# Patient Record
Sex: MALE | Race: BLACK OR AFRICAN AMERICAN | Employment: FULL TIME | URBAN - METROPOLITAN AREA
[De-identification: names, ages, dates, MRNs, and addresses within clinical notes are randomized per-mention and may not be internally consistent; named-entity substitution may affect disease eponyms.]

---

## 2017-12-04 ENCOUNTER — APPOINTMENT (OUTPATIENT)
Dept: PHYSICAL THERAPY | Age: 47
End: 2017-12-04

## 2019-05-29 ENCOUNTER — OFFICE VISIT (OUTPATIENT)
Dept: ORTHOPEDIC SURGERY | Age: 49
End: 2019-05-29

## 2019-05-29 VITALS
DIASTOLIC BLOOD PRESSURE: 76 MMHG | SYSTOLIC BLOOD PRESSURE: 127 MMHG | HEART RATE: 84 BPM | OXYGEN SATURATION: 99 % | RESPIRATION RATE: 16 BRPM

## 2019-05-29 DIAGNOSIS — M17.12 PRIMARY OSTEOARTHRITIS OF LEFT KNEE: Primary | ICD-10-CM

## 2019-05-29 DIAGNOSIS — M25.562 ACUTE PAIN OF LEFT KNEE: ICD-10-CM

## 2019-05-29 RX ORDER — TRIAMCINOLONE ACETONIDE 40 MG/ML
40 INJECTION, SUSPENSION INTRA-ARTICULAR; INTRAMUSCULAR ONCE
Qty: 1 ML | Refills: 0
Start: 2019-05-29 | End: 2019-05-29

## 2019-05-29 RX ORDER — MELOXICAM 15 MG/1
15 TABLET ORAL
Qty: 30 TAB | Refills: 1 | Status: CANCELLED | OUTPATIENT
Start: 2019-05-29

## 2019-05-29 NOTE — PROGRESS NOTES
Fatoumata Scott  1970   Chief Complaint   Patient presents with    Knee Pain     left knee pain        HISTORY OF PRESENT ILLNESS  Jacob Madera is a 47 y.o. male who presents today for evaluation of left knee pain. The patient was referred by Yoni Coker. He rates his pain 3/10 today. Pain has been present for a couple months. He states the pain is worst in the winter time. Associated symptom includes swelling. He does report trying to loss weight recently. Patient describes the pain as aching that is Intermittent in nature. Symptoms are worse with walking and standing , Activity and is better with  Rest. Associated symptoms include Swelling. Since problem started, it: has improved. Pain does not wake patient up at night. Has taken no meds for the problem. Has tried following treatments: Injections:NO; Brace:NO; Therapy:NO; Cane/Crutch:NO       Allergies not on file     No past medical history on file.    Social History     Socioeconomic History    Marital status: UNKNOWN     Spouse name: Not on file    Number of children: Not on file    Years of education: Not on file    Highest education level: Not on file   Occupational History    Not on file   Social Needs    Financial resource strain: Not on file    Food insecurity:     Worry: Not on file     Inability: Not on file    Transportation needs:     Medical: Not on file     Non-medical: Not on file   Tobacco Use    Smoking status: Not on file   Substance and Sexual Activity    Alcohol use: Not on file    Drug use: Not on file    Sexual activity: Not on file   Lifestyle    Physical activity:     Days per week: Not on file     Minutes per session: Not on file    Stress: Not on file   Relationships    Social connections:     Talks on phone: Not on file     Gets together: Not on file     Attends Taoist service: Not on file     Active member of club or organization: Not on file     Attends meetings of clubs or organizations: Not on file     Relationship status: Not on file    Intimate partner violence:     Fear of current or ex partner: Not on file     Emotionally abused: Not on file     Physically abused: Not on file     Forced sexual activity: Not on file   Other Topics Concern    Not on file   Social History Narrative    Not on file      No past surgical history on file. No family history on file. Current Outpatient Medications   Medication Sig    triamcinolone acetonide (KENALOG) 40 mg/mL injection 1 mL by IntraMUSCular route once for 1 dose. No current facility-administered medications for this visit. REVIEW OF SYSTEM   Patient denies: Weight loss, Fever/Chills, HA, Visual changes, Fatigue, Chest pain, SOB, Abdominal pain, N/V/D/C, Blood in stool or urine, Edema. Pertinent positive as above in HPI. All others were negative    PHYSICAL EXAM:   Visit Vitals  /76 (BP 1 Location: Right arm, BP Patient Position: Sitting)   Pulse 84   Resp 16   SpO2 99%     The patient is a well-developed, well-nourished male   in no acute distress. The patient is alert and oriented times three. The patient is alert and oriented times three. Mood and affect are normal.  LYMPHATIC: lymph nodes are not enlarged and are within normal limits  SKIN: normal in color and non tender to palpation. There are no bruises or abrasions noted. NEUROLOGICAL: Motor sensory exam is within normal limits. Reflexes are equal bilaterally.  There is normal sensation to pinprick and light touch  MUSCULOSKELETAL:  Examination Left knee   Skin Intact   Range of motion 0-130   Effusion +   Medial joint line tenderness +   Lateral joint line tenderness -   Tenderness Pes Bursa -   Tenderness insertion MCL -   Tenderness insertion LCL -   Vernas -   Patella crepitus +   Patella grind -   Lachman -   Pivot shift -   Anterior drawer -   Posterior drawer -   Varus stress -   Valgus stress -   Neurovascular Intact   Calf Swelling and Tenderness to Palpation - Josue's Test -   Hamstring Cord Tightness -      PROCEDURE: Left Knee Injection with Ultrasound Guidance  Indication:Left Knee pain/swelling    After sterile prep, 4 cc of Xylocaine and 1 cc of Kenalog were injected into the left knee. Ultrasound images captured using [employer] Ultrasound machine and scanned into patient's chart. [facility]  OFFICE PROCEDURE PROGRESS NOTE        Chart reviewed for the following:  Camilo Prado M.D, have reviewed the History, Physical and updated the Allergic reactions for Haskel Brunner performed immediately prior to start of procedure:  Camilo Prado M.D, have performed the following reviews on [address] prior to the start of the procedure:            * Patient was identified by name and date of birth   * Agreement on procedure being performed was verified  * Risks and Benefits explained to the patient  * Procedure site verified and marked as necessary  * Patient was positioned for comfort  * Consent was signed and verified     Time: 10:57 AM     Date of procedure: 5/29/2019    Procedure performed by:  Lucy Rodriguez M.D    Provider assisted by: (see medication administration)    How tolerated by patient: tolerated the procedure well with no complications    Comments: none    IMAGING: XR of left knee dated 5/29/19 was reviewed and read: slight decrease in the medial joint line with mild degenerative changes in the patellofemoral joint. IMPRESSION:      ICD-10-CM ICD-9-CM    1. Primary osteoarthritis of left knee M17.12 715.16 TRIAMCINOLONE ACETONIDE INJ      triamcinolone acetonide (KENALOG) 40 mg/mL injection      US GUIDE INJ/ASP/ARTHRO LG JNT/BURSA   2. Acute pain of left knee M25.562 719.46 AMB POC XRAY, KNEE; 1/2 VIEWS        PLAN:  1. The patient presents today with left knee pain due to osteoarthritis and I would like to try an injection today.   Risk factors include: n/a  2. No ultrasound exam indicated today  3. Yes cortisone injection indicated today L KNEE US  4. No Physical/Occupational Therapy indicated today  5. No diagnostic test indicated today:   6. No durable medical equipment indicated today  7. No referral indicated today   8. No medications indicated today:   9. No Narcotic indicated today     RTC 3 weeks if pain continues     Office note will be sent to referring provider. Scribed by Asia Banuelos ([address] [place] [address] [address]) as dictated by Anna Kelsey MD    I, Dr. Anna Kelsey, confirm that all documentation is accurate.     Anna Kelsey M.D.   [facility]

## 2019-07-02 ENCOUNTER — OFFICE VISIT (OUTPATIENT)
Dept: ORTHOPEDIC SURGERY | Age: 49
End: 2019-07-02

## 2019-07-02 VITALS
BODY MASS INDEX: 43.1 KG/M2 | DIASTOLIC BLOOD PRESSURE: 86 MMHG | TEMPERATURE: 98.2 F | HEART RATE: 87 BPM | HEIGHT: 69 IN | WEIGHT: 291 LBS | OXYGEN SATURATION: 97 % | RESPIRATION RATE: 16 BRPM | SYSTOLIC BLOOD PRESSURE: 133 MMHG

## 2019-07-02 DIAGNOSIS — S83.242A ACUTE MEDIAL MENISCUS TEAR OF LEFT KNEE, INITIAL ENCOUNTER: Primary | ICD-10-CM

## 2019-07-02 DIAGNOSIS — M17.12 PRIMARY OSTEOARTHRITIS OF LEFT KNEE: ICD-10-CM

## 2019-07-02 PROBLEM — E66.01 OBESITY, MORBID (HCC): Status: ACTIVE | Noted: 2019-07-02

## 2019-07-02 RX ORDER — GLIPIZIDE 10 MG/1
10 TABLET ORAL 2 TIMES DAILY
COMMUNITY

## 2019-07-02 RX ORDER — TRAMADOL HYDROCHLORIDE 50 MG/1
50 TABLET ORAL
COMMUNITY

## 2019-07-02 NOTE — PATIENT INSTRUCTIONS
Meniscus Tear: Care Instructions Your Care Instructions The meniscus is rubbery tissue in the knee that acts as a shock absorber between the upper and lower leg bones. The meniscus also keeps your knee stable by spreading weight across it. Each knee has two menisci (plural of meniscus). You can tear a meniscus if you plant your foot and twist, or pivot. The meniscus also can wear down as you age, and it can tear from squatting or kneeling. Small tears may heal on their own with rest and some physical therapy. But a more serious tear may need surgery to repair it or to remove part of the meniscus. Your doctor may want you to see a doctor who specializes in bones and sports injuries. Follow-up care is a key part of your treatment and safety. Be sure to make and go to all appointments, and call your doctor if you are having problems. It's also a good idea to know your test results and keep a list of the medicines you take. How can you care for yourself at home? · Rest your knee when possible. · Do not squat or kneel. · Take pain medicines exactly as directed. ? If the doctor gave you a prescription medicine for pain, take it as prescribed. ? If you are not taking a prescription pain medicine, ask your doctor if you can take an over-the-counter medicine. · Put ice or a cold pack on your knee for 10 to 20 minutes at a time. Try to do this every 1 to 2 hours for the next 3 days (when you are awake) or until the swelling goes down. Put a thin cloth between the ice and your skin. · Prop up the sore leg on a pillow when you ice your knee or any time you sit or lie down during the next 3 days. Try to keep your leg above the level of your heart. This will help reduce swelling. · Follow your doctor's directions for using crutches or a knee brace, if suggested. · Follow your doctor's directions for exercises to keep your knee mobile and your leg muscles strong.  Here are a few exercises you can try if your doctor says it is okay. ? Quad sets: Lie down on the floor or the bed with your injured leg straight. Fully extend your legthere should be no or little bend in your knee. Tighten the thigh (quadriceps) of your injured leg for 6 seconds. Do not lift your heel up. Relax your quadriceps for 10 seconds. Repeat this exercise 8 to 12 times several times during the day. ? Straight-leg raises: Lie down on the floor or the bed with your injured leg flat and your uninjured leg bent so that the bottom of your foot is on the floor or bed. Tighten the quadriceps of your injured leg. Keeping your knee as straight as possible, lift your injured leg off the bed until it is about 18 inches above the bed or floor. Lower your leg back down and relax for 5 seconds. Do 3 sets of 20 repetitions, or if you tire quickly, 3 sets of 8 to 12 repetitions. ? Heel raises: Stand with your feet a few inches apart. Rest your hands lightly on a counter or chair in front of you. Slowly raise your heels off the floor while keeping your knees straight. Hold for 3 seconds, then slowly lower your heels to the floor. Do 3 sets of 8 to 12 repetitions. ? Heel slides: Lie down on the floor or the bed with your leg flat. Slowly begin to slide your heel toward your rear end (buttocks), keeping your heel on the floor. Your knee will begin to bend. Slide your heel and bend your knee until it becomes a little sore and you can feel a small amount of pressure inside your knee. Hold this position for 10 seconds. Slide your heel back down until your leg is straight on the floor. Relax for 10 seconds. Repeat this exercise 20 times. When should you call for help? Watch closely for changes in your health, and be sure to contact your doctor if: 
  · You have increasing knee pain or swelling or both.  
  · Your knee is so sore or stiff that you cannot walk on it.  
  · You do not get better as expected. Where can you learn more? Go to http://wilmar-izzy.info/. Enter R346 in the search box to learn more about \"Meniscus Tear: Care Instructions. \" Current as of: September 20, 2018 Content Version: 11.9 © 6770-5763 [employer]. Care instructions adapted under license by [employer] (which disclaims liability or warranty for this information). If you have questions about a medical condition or this instruction, always ask your healthcare professional. [place] [address] any warranty or liability for your use of this information.

## 2019-07-02 NOTE — PROGRESS NOTES
Carlyle Gilberto Scott  1970   Chief Complaint   Patient presents with    Knee Pain     left knee pain, f/u appt. HISTORY OF PRESENT ILLNESS  Renetta Coles is a 47 y.o. male who presents today for reevaluation of left knee pain. Patient rates pain as 0/10 today. Pain has been present for about a year. He states the pain is worst in the winter time. Associated symptom includes swelling. He does report trying to loss weight recently. At last OV, patient had a cortisone injection which provided good relief. Pt still has swelling in knee. Patient denies any fever, chills, chest pain, shortness of breath or calf pain. The remainder of the review of systems is negative. There are no new illness or injuries to report since last seen in the office. There are no changes to medications, allergies, family or social history. PHYSICAL EXAM:   Visit Vitals  /86 (BP 1 Location: Left arm, BP Patient Position: Sitting)   Pulse 87   Temp 98.2 °F (36.8 °C) (Oral)   Resp 16   Ht 5' 9\" (1.753 m)   Wt 291 lb (132 kg)   SpO2 97%   BMI 42.97 kg/m²     The patient is a well-developed, well-nourished male   in no acute distress. The patient is alert and oriented times three. The patient is alert and oriented times three. Mood and affect are normal.  LYMPHATIC: lymph nodes are not enlarged and are within normal limits  SKIN: normal in color and non tender to palpation. There are no bruises or abrasions noted. NEUROLOGICAL: Motor sensory exam is within normal limits. Reflexes are equal bilaterally.  There is normal sensation to pinprick and light touch  MUSCULOSKELETAL:  Examination Left knee   Skin Intact   Range of motion 0-130   Effusion +   Medial joint line tenderness +   Lateral joint line tenderness -   Tenderness Pes Bursa -   Tenderness insertion MCL -   Tenderness insertion LCL -   Vernas -   Patella crepitus +   Patella grind -   Lachman -   Pivot shift -   Anterior drawer -   Posterior drawer -   Varus stress -   Valgus stress -   Neurovascular Intact   Calf Swelling and Tenderness to Palpation -   Josue's Test -   Hamstring Cord Tightness      IMAGING: XR of left knee dated 5/29/19 was reviewed and read: slight decrease in the medial joint line with mild degenerative changes in the patellofemoral joint. IMPRESSION:      ICD-10-CM ICD-9-CM    1. Acute medial meniscus tear of left knee, initial encounter S83.242A 836.0 MRI KNEE LT WO CONT   2. Primary osteoarthritis of left knee M17.12 715.16         PLAN:   1. Pt presents today with left knee pain and I would like to get an MRI to r/o meniscal tear. Risk factors include: n/a  2. No ultrasound exam indicated today  3. No cortisone injection indicated today   4. No Physical/Occupational Therapy indicated today  5. Yes diagnostic test indicated today: MRI L KNEE  6. No durable medical equipment indicated today  7. No referral indicated today   8. No medications indicated today:   9. No Narcotic indicated today       RTC following MRI      Scribed by Festus Ganser [address]) as dictated by Hema Melendez MD    I, Dr. Hema Melendez, confirm that all documentation is accurate.     Hema Melendez M.D.   [facility]

## 2019-07-02 NOTE — PROGRESS NOTES
1. Have you been to the ER, urgent care clinic since your last visit? Hospitalized since your last visit? NO    2. Have you seen or consulted any other health care providers outside of the [address] [place] [address] since your last visit? Include any pap smears or colon screening.  NO

## 2019-07-30 ENCOUNTER — DOCUMENTATION ONLY (OUTPATIENT)
Dept: ORTHOPEDIC SURGERY | Age: 49
End: 2019-07-30

## 2019-07-30 NOTE — PROGRESS NOTES
Order and office notes have been faxed to [address] [place] [address] to have them call patient to set up MRI Knee. They will authorize with the insurance if needed. Patient aware.

## 2019-09-03 DIAGNOSIS — S83.242A ACUTE MEDIAL MENISCUS TEAR OF LEFT KNEE, INITIAL ENCOUNTER: ICD-10-CM

## 2020-06-11 ENCOUNTER — OFFICE VISIT (OUTPATIENT)
Dept: ORTHOPEDIC SURGERY | Age: 50
End: 2020-06-11

## 2020-06-11 VITALS — HEIGHT: 69 IN | BODY MASS INDEX: 45.2 KG/M2 | WEIGHT: 305.2 LBS | TEMPERATURE: 97.9 F

## 2020-06-11 DIAGNOSIS — S83.242D TEAR OF MEDIAL MENISCUS OF LEFT KNEE, CURRENT, UNSPECIFIED TEAR TYPE, SUBSEQUENT ENCOUNTER: Primary | ICD-10-CM

## 2020-06-11 DIAGNOSIS — M17.12 PRIMARY OSTEOARTHRITIS OF LEFT KNEE: ICD-10-CM

## 2020-06-11 RX ORDER — DICLOFENAC SODIUM 10 MG/G
GEL TOPICAL 4 TIMES DAILY
COMMUNITY

## 2020-06-11 RX ORDER — TRIAMCINOLONE ACETONIDE 40 MG/ML
40 INJECTION, SUSPENSION INTRA-ARTICULAR; INTRAMUSCULAR ONCE
Qty: 1 ML | Refills: 0
Start: 2020-06-11 | End: 2020-06-11

## 2020-06-11 RX ORDER — ALOGLIPTIN 25 MG/1
25 TABLET, FILM COATED ORAL DAILY
COMMUNITY

## 2020-06-11 RX ORDER — ASPIRIN 81 MG/1
TABLET ORAL DAILY
COMMUNITY

## 2020-06-11 RX ORDER — NAPROXEN SODIUM 220 MG
220 TABLET ORAL 2 TIMES DAILY WITH MEALS
COMMUNITY

## 2020-06-11 RX ORDER — LISINOPRIL 10 MG/1
TABLET ORAL DAILY
COMMUNITY

## 2020-06-11 NOTE — PROGRESS NOTES
1. Have you been to the ER, urgent care clinic since your last visit? Hospitalized since your last visit? No    2. Have you seen or consulted any other health care providers outside of the [address] [place] [address] since your last visit? Include any pap smears or colon screening.  No

## 2020-06-11 NOTE — PROGRESS NOTES
Osiris Scott  1970   Chief Complaint   Patient presents with    Knee Pain     Left knee pain         HISTORY OF PRESENT ILLNESS  Muriel Grande is a 45 y.o. male who presents today for reevaluation of left knee pain and MRI review. Patient rates pain as 5/10 today. Pain has been present for awhile. Pt was last seen here in July 2019. [facility] The patient had a cortisone injection on 5/29/19 which provided good relief for around 3 months. Pt reports limping. Has been using a knee brace and has tried taking Aleve. Surgery was discussed with the patient today but they are not ready for surgery at this point. Patient denies any fever, chills, chest pain, shortness of breath or calf pain. The remainder of the review of systems is negative. There are no new illness or injuries to report since last seen in the office. There are no changes to medications, allergies, family or social history. Pain Assessment  6/11/2020   Location of Pain Knee   Location Modifiers Left   Severity of Pain 5   Quality of Pain Throbbing   Quality of Pain Comment -   Duration of Pain A few hours   Duration of Pain Comment -   Frequency of Pain Intermittent   Aggravating Factors -   Aggravating Factors Comment -   Limiting Behavior -   Relieving Factors Rest;Elevation; Heat   Result of Injury No     PHYSICAL EXAM:   Visit Vitals  Temp 97.9 °F (36.6 °C) (Oral)   Ht 5' 9\" (1.753 m)   Wt 305 lb 3.2 oz (138.4 kg)   BMI 45.07 kg/m²     The patient is a well-developed, well-nourished male   in no acute distress. The patient is alert and oriented times three. The patient is alert and oriented times three. Mood and affect are normal.  LYMPHATIC: lymph nodes are not enlarged and are within normal limits  SKIN: normal in color and non tender to palpation. There are no bruises or abrasions noted. NEUROLOGICAL: Motor sensory exam is within normal limits. Reflexes are equal bilaterally.  There is normal sensation to pinprick and light touch  MUSCULOSKELETAL:  Examination Left knee   Skin Intact   Range of motion 0-130   Effusion +   Medial joint line tenderness +   Lateral joint line tenderness -   Tenderness Pes Bursa -   Tenderness insertion MCL -   Tenderness insertion LCL -   Vernas -   Patella crepitus +   Patella grind -   Lachman -   Pivot shift -   Anterior drawer -   Posterior drawer -   Varus stress -   Valgus stress -   Neurovascular Intact   Calf Swelling and Tenderness to Palpation -   Josue's Test -   Hamstring Cord Tightness      PROCEDURE: After sterile prep, 4 cc of Xylocaine and 1 cc of Kenalog were injected into the left knee. [facility]  OFFICE PROCEDURE PROGRESS NOTE        Chart reviewed for the following:  Osiris Lo MD, have reviewed the History, Physical and updated the Allergic reactions for [address] performed immediately prior to start of procedure:  Osiris Lo MD, have performed the following reviews on [address] prior to the start of the procedure:            * Patient was identified by name and date of birth   * Agreement on procedure being performed was verified  * Risks and Benefits explained to the patient  * Procedure site verified and marked as necessary  * Patient was positioned for comfort  * Consent was signed and verified     Time: 10:07 AM    Date of procedure: 6/11/2020    Procedure performed by:  Gwen Norris MD    Provider assisted by: (see medication administration)    How tolerated by patient: tolerated the procedure well with no complications    Comments: none    IMAGING: MRI of left knee [facility] dated 8/21/19 reviewed and read by Dr. Margoth Stewart:  IMPRESSION:  1. 1-2 cm horizontal oblique tear posterior horn medial meniscus  2. Anterior cruciate ligament intact.   3. Bicompartmental chondromalacia,. Grade 3-4 patellofemoral compartment and medial compartment. 4. Mild diffuse patellar tendinopathy. XR of left knee dated 5/29/19 was reviewed and read: Decreased joint space on the medial side. IMPRESSION:      ICD-10-CM ICD-9-CM    1. Tear of medial meniscus of left knee, current, unspecified tear type, subsequent encounter S83.242D V58.89 TRIAMCINOLONE ACETONIDE INJ     836.0 triamcinolone acetonide (Kenalog) 40 mg/mL injection      DRAIN/INJECT LARGE JOINT/BURSA   2. Primary osteoarthritis of left knee M17.12 715.16 TRIAMCINOLONE ACETONIDE INJ      triamcinolone acetonide (Kenalog) 40 mg/mL injection      DRAIN/INJECT LARGE JOINT/BURSA        PLAN:   1. Pt presents today with left knee pain due to an MRI-documented medial meniscus tear and primary OA and I would like to try an injection today. Surgery was discussed with the patient today but they are not ready for surgery at this point, would like to try injection first.   Risk factors include: BMI>40  2. No ultrasound exam indicated today  3. Yes cortisone injection indicated today L KNEE  4. No Physical/Occupational Therapy indicated today  5. No diagnostic test indicated today  6. No durable medical equipment indicated today  7. No referral indicated today   8. No medications indicated today:   9. No Narcotic indicated today       RTC prn      Scribed by Esperanza Oseguera) as dictated by Montez Sal MD    I, Dr. Montez Sal, confirm that all documentation is accurate.     Montez Sal M.D.   [facility]

## 2020-10-12 ENCOUNTER — OFFICE VISIT (OUTPATIENT)
Dept: ORTHOPEDIC SURGERY | Age: 50
End: 2020-10-12
Payer: COMMERCIAL

## 2020-10-12 VITALS
HEART RATE: 82 BPM | SYSTOLIC BLOOD PRESSURE: 126 MMHG | HEIGHT: 69 IN | TEMPERATURE: 97.8 F | DIASTOLIC BLOOD PRESSURE: 87 MMHG | BODY MASS INDEX: 41.47 KG/M2 | OXYGEN SATURATION: 99 % | WEIGHT: 280 LBS

## 2020-10-12 DIAGNOSIS — S83.242D TEAR OF MEDIAL MENISCUS OF LEFT KNEE, CURRENT, UNSPECIFIED TEAR TYPE, SUBSEQUENT ENCOUNTER: Primary | ICD-10-CM

## 2020-10-12 DIAGNOSIS — M22.2X1 PATELLOFEMORAL PAIN SYNDROME OF RIGHT KNEE: ICD-10-CM

## 2020-10-12 DIAGNOSIS — M17.12 PRIMARY OSTEOARTHRITIS OF LEFT KNEE: ICD-10-CM

## 2020-10-12 DIAGNOSIS — M25.561 RIGHT KNEE PAIN, UNSPECIFIED CHRONICITY: ICD-10-CM

## 2020-10-12 PROCEDURE — 73560 X-RAY EXAM OF KNEE 1 OR 2: CPT | Performed by: ORTHOPAEDIC SURGERY

## 2020-10-12 PROCEDURE — 99213 OFFICE O/P EST LOW 20 MIN: CPT | Performed by: ORTHOPAEDIC SURGERY

## 2020-10-12 RX ORDER — MELOXICAM 15 MG/1
15 TABLET ORAL
Qty: 30 TAB | Refills: 1 | Status: SHIPPED | OUTPATIENT
Start: 2020-10-12

## 2020-10-12 NOTE — PROGRESS NOTES
Analy Scott  1970   Chief Complaint   Patient presents with    Knee Pain     BILAT        HISTORY OF PRESENT ILLNESS  Carolin Dill is a 45 y.o. male who presents today for reevaluation of b/l knee pain. L>R. Patient rates pain as 5/10 today. Pain has been present for awhile. At last OV on 6/11/2020, patient had a left knee cortisone injection. Left knee pain has worsened since last OV. He wants to set up surgery for the left knee. He has an MRI-documented left medial meniscus tear. Pt reports right knee pain X 2 months. Right knee pain is not as bad as left knee. [facility]. The patient also had a cortisone injection on 5/29/19 which provided good relief for around 3 months. Pt reports limping. Has been using a knee brace and has tried taking Aleve. Patient denies any fever, chills, chest pain, shortness of breath or calf pain. The remainder of the review of systems is negative. There are no new illness or injuries to report since last seen in the office. There are no changes to medications, allergies, family or social history. Pain Assessment  10/12/2020   Location of Pain Knee   Location Modifiers Left;Right   Severity of Pain 5   Quality of Pain Sharp   Quality of Pain Comment -   Duration of Pain -   Duration of Pain Comment -   Frequency of Pain Constant   Aggravating Factors Walking;Standing   Aggravating Factors Comment -   Limiting Behavior Yes   Relieving Factors Nothing   Result of Injury No     PHYSICAL EXAM:   Visit Vitals  /87   Pulse 82   Temp 97.8 °F (36.6 °C) (Oral)   Ht 5' 9\" (1.753 m)   Wt 280 lb (127 kg)   SpO2 99%   BMI 41.35 kg/m²     The patient is a well-developed, well-nourished male   in no acute distress. The patient is alert and oriented times three. The patient is alert and oriented times three.  Mood and affect are normal.  LYMPHATIC: lymph nodes are not enlarged and are within normal limits  SKIN: normal in color and non tender to palpation. There are no bruises or abrasions noted. NEUROLOGICAL: Motor sensory exam is within normal limits. Reflexes are equal bilaterally. There is normal sensation to pinprick and light touch  MUSCULOSKELETAL:  Examination Left knee   Skin Intact   Range of motion 0-130   Effusion +   Medial joint line tenderness +   Lateral joint line tenderness -   Tenderness Pes Bursa -   Tenderness insertion MCL -   Tenderness insertion LCL -   Vernas -   Patella crepitus +   Patella grind -   Lachman -   Pivot shift -   Anterior drawer -   Posterior drawer -   Varus stress -   Valgus stress -   Neurovascular Intact   Calf Swelling and Tenderness to Palpation -   Josue's Test -   Hamstring Cord Tightness      Examination Right knee   Skin Intact   Range of motion 0-130   Effusion -   Medial joint line tenderness -   Lateral joint line tenderness -   Tenderness Pes Bursa -   Tenderness insertion MCL -   Tenderness insertion LCL -   Vernas -   Patella crepitus -   Patella grind -   Lachman -   Pivot shift -   Anterior drawer -   Posterior drawer -   Varus stress -   Valgus stress -   Neurovascular Intact   Calf Swelling and Tenderness to Palpation -   Josue's Test -   Hamstring Cord Tightness -         IMAGING: XR of right knee obtained in the office dated 10/12/2020 was reviewed and read by Dr. Jared Herrera: mild degebnerative changes in the patello femoral joint        MRI of left knee [facility] dated 8/21/19 reviewed and read by Dr. Jared Herrera:  IMPRESSION:  1. 1-2 cm horizontal oblique tear posterior horn medial meniscus  2. Anterior cruciate ligament intact. 3. Bicompartmental chondromalacia,. Grade 3-4 patellofemoral compartment and medial compartment. 4. Mild diffuse patellar tendinopathy. XR of left knee dated 5/29/19 was reviewed and read: Decreased joint space on the medial side. IMPRESSION:      ICD-10-CM ICD-9-CM    1.  Tear of medial meniscus of left knee, current, unspecified tear type, subsequent encounter  S83.242D V58.89 meloxicam (Mobic) 15 mg tablet     836.0    2. Right knee pain, unspecified chronicity  M25.561 719.46 AMB POC XRAY, KNEE; 1/2 VIEWS   3. Primary osteoarthritis of left knee  M17.12 715.16 meloxicam (Mobic) 15 mg tablet   4. Patellofemoral pain syndrome of right knee  M22.2X1 719.46 meloxicam (Mobic) 15 mg tablet        PLAN:   1. Pt presents today with left knee pain due to an MRI-documented medial meniscus tear and he would like to proceed with scheduling surgery for the left knee. Cortisone injections for the right knee were discussed with the patient today but they feel the pain is not enough to move forward with a cortisone injection at this time. Was given a prescription for Mobic today. I discussed the risks and benefits and potential adverse outcomes of both operative vs non operative treatment of left knee medial meniscus tear with the patient and patient wishes to proceed with arthroscopic left partial medial meniscectomy. Risks of operative intervention include but not limited to bleeding, infection, deep vein thrombosis, pulmonary embolism, death, limb length discrepancy, reflexive sympathetic dystrophy, fat embolism syndrome,damage to blood vessels and nerves, malunion, non-union, delayed union, failure of hardware, post traumatic arthritis, stroke, heart attack, and death. Patient understands that infection may arise and may require numerous surgeries. The patient was counseled at length about the risks of sandro Covid-19 during their perioperative period and any recovery window from their procedure. The patient was made aware that sandro Covid-19  may worsen their prognosis for recovering from their procedure and lend to a higher morbidity and/or mortality risk. All material risks, benefits, and reasonable alternatives including postponing the procedure were discussed.  The patient does  wish to proceed with the procedure at this time.       History and physical exam to be preformed at a later date. Risk factors include: BMI>40  2. No ultrasound exam indicated today  3. No cortisone injection indicated today  4. No Physical/Occupational Therapy indicated today  5. No diagnostic test indicated today  6. No durable medical equipment indicated today  7. No referral indicated today   8. Yes medications indicated today: MOBIC  9. No Narcotic indicated today       RTC H&P      Scribed by [employer] [address] [place] [address] [address]) as dictated by Hesham Kang MD    I, Dr. Hesham Kang, confirm that all documentation is accurate.     Hesham Kang M.D.   [facility]

## 2020-12-03 DIAGNOSIS — Z01.818 PREOP EXAMINATION: ICD-10-CM

## 2020-12-03 DIAGNOSIS — Z01.818 PREOP EXAMINATION: Primary | ICD-10-CM

## 2020-12-30 ENCOUNTER — HOSPITAL ENCOUNTER (OUTPATIENT)
Dept: LAB | Age: 50
Discharge: HOME | End: 2020-12-30
Payer: OTHER GOVERNMENT

## 2020-12-30 LAB
ANION GAP SERPL CALC-SCNC: 7 MMOL/L (ref 3–18)
BASOPHILS # BLD: 0 K/UL (ref 0–0.1)
BASOPHILS NFR BLD: 0 % (ref 0–2)
BUN SERPL-MCNC: 10 MG/DL (ref 7–18)
BUN/CREAT SERPL: 10 (ref 12–20)
CALCIUM SERPL-MCNC: 9.3 MG/DL (ref 8.5–10.1)
CHLORIDE SERPL-SCNC: 104 MMOL/L (ref 100–111)
CO2 SERPL-SCNC: 26 MMOL/L (ref 21–32)
CREAT SERPL-MCNC: 0.98 MG/DL (ref 0.6–1.3)
DIFFERENTIAL METHOD BLD: ABNORMAL
EOSINOPHIL # BLD: 0.2 K/UL (ref 0–0.4)
EOSINOPHIL NFR BLD: 1 % (ref 0–5)
ERYTHROCYTE [DISTWIDTH] IN BLOOD BY AUTOMATED COUNT: 14.4 % (ref 11.6–14.5)
GLUCOSE SERPL-MCNC: 113 MG/DL (ref 74–99)
HCT VFR BLD AUTO: 43.2 % (ref 36–48)
HGB BLD-MCNC: 13.9 G/DL (ref 13–16)
LYMPHOCYTES # BLD: 4.1 K/UL (ref 0.9–3.6)
LYMPHOCYTES NFR BLD: 32 % (ref 21–52)
MCH RBC QN AUTO: 26.7 PG (ref 24–34)
MCHC RBC AUTO-ENTMCNC: 32.2 G/DL (ref 31–37)
MCV RBC AUTO: 82.9 FL (ref 74–97)
MONOCYTES # BLD: 0.6 K/UL (ref 0.05–1.2)
MONOCYTES NFR BLD: 5 % (ref 3–10)
NEUTS SEG # BLD: 7.9 K/UL (ref 1.8–8)
NEUTS SEG NFR BLD: 62 % (ref 40–73)
PLATELET # BLD AUTO: 365 K/UL (ref 135–420)
PMV BLD AUTO: 10.6 FL (ref 9.2–11.8)
POTASSIUM SERPL-SCNC: 4.3 MMOL/L (ref 3.5–5.5)
RBC # BLD AUTO: 5.21 M/UL (ref 4.7–5.5)
SODIUM SERPL-SCNC: 137 MMOL/L (ref 136–145)
WBC # BLD AUTO: 12.8 K/UL (ref 4.6–13.2)

## 2020-12-30 PROCEDURE — 36415 COLL VENOUS BLD VENIPUNCTURE: CPT

## 2020-12-30 PROCEDURE — 85025 COMPLETE CBC W/AUTO DIFF WBC: CPT

## 2020-12-30 PROCEDURE — 93005 ELECTROCARDIOGRAM TRACING: CPT

## 2020-12-30 PROCEDURE — 80048 BASIC METABOLIC PNL TOTAL CA: CPT

## 2020-12-31 LAB
ATRIAL RATE: 79 BPM
CALCULATED P AXIS, ECG09: 47 DEGREES
CALCULATED R AXIS, ECG10: 13 DEGREES
CALCULATED T AXIS, ECG11: 17 DEGREES
DIAGNOSIS, 93000: NORMAL
P-R INTERVAL, ECG05: 172 MS
Q-T INTERVAL, ECG07: 366 MS
QRS DURATION, ECG06: 96 MS
QTC CALCULATION (BEZET), ECG08: 419 MS
VENTRICULAR RATE, ECG03: 79 BPM

## 2021-01-04 ENCOUNTER — OFFICE VISIT (OUTPATIENT)
Dept: ORTHOPEDIC SURGERY | Age: 51
End: 2021-01-04

## 2021-01-04 VITALS
HEIGHT: 69 IN | OXYGEN SATURATION: 99 % | DIASTOLIC BLOOD PRESSURE: 72 MMHG | RESPIRATION RATE: 16 BRPM | HEART RATE: 87 BPM | WEIGHT: 308.4 LBS | SYSTOLIC BLOOD PRESSURE: 129 MMHG | TEMPERATURE: 96.4 F | BODY MASS INDEX: 45.68 KG/M2

## 2021-01-04 DIAGNOSIS — S83.242D TEAR OF MEDIAL MENISCUS OF LEFT KNEE, CURRENT, UNSPECIFIED TEAR TYPE, SUBSEQUENT ENCOUNTER: Primary | ICD-10-CM

## 2021-01-04 DIAGNOSIS — M17.12 PRIMARY OSTEOARTHRITIS OF LEFT KNEE: ICD-10-CM

## 2021-01-04 RX ORDER — HYDROCODONE BITARTRATE AND ACETAMINOPHEN 7.5; 325 MG/1; MG/1
1 TABLET ORAL
Qty: 40 TAB | Refills: 0 | Status: SHIPPED | OUTPATIENT
Start: 2021-01-04 | End: 2021-01-11

## 2021-01-04 NOTE — PATIENT INSTRUCTIONS
Dr. Clarke Knee Arthroscopy Surgery    What is the surgery?  - This is an outpatient procedure at either [facility] or [facility]  - You will be completely asleep for procedure. Dr. Clarke will make 2 small incisions in your knee. He will take a tour of your knee with the camera and then address the meniscal tear(s). We will be able to evaluate if any arthritis in your knee but this surgery is not to treat your arthritis.  - Total surgery takes about 25-30 mins     What can you expect after surgery?  - You will have a bulky dressing on your knee that you can remove 2 days after surgery. You will be able to shower 2 days after surgery but no soaking in a bath, hot tub, ocean or pool x 2 weeks to allow for full wound healing. No special brace is needed.  - You will be on crutches or a walker when you leave the hospital. You can place weight on your leg as tolerated starting immediately. You are usually on crutches or your walker for about 4-5 days.   - Even though you can place weight on your leg, we recommend no walking or standing longer than 10mins for the first week. We will gradually increase your activities after that point.    - Dr. Clarke will start physical therapy for you when you return for your 1 week post op apt  - It will take your 6-8 weeks to fully recover from your surgery.    When can I return to work?  - Most patients return to desk work only after 1-2 weeks. We recommend no prolonged walking or standing, climbing, kneeling, or crawling x 6-8 weeks.     Not all knee arthroscopies are the same. The specifics of your individual case will be discussed at length with you by Dr. Clarke and his Physician Assistant.        Marita Rincon  Surgical Coordinator  [address] [place] [address]  [email or web address]  P: [phone]  F: [phone]

## 2021-01-04 NOTE — PROGRESS NOTES
HISTORY AND PHYSICAL          Patient: Ivon De Oliveira                MRN: [id number]       SSN: [id number]  YOB: 1970          AGE: 45 y.o. SEX: male      Patient scheduled for:  Left knee arthroscopic partial medial menisectomy    Surgeon: Lázaro Cano MD    ANESTHESIA TYPE:  General    HISTORY:     The patient was seen in the office today for a preoperative history and physical for an upcoming above listed surgery. The patient is a pleasant 45 y.o. male who has a history of left knee pain. Patient rates pain as 5/10 today. Pain has been present for awhile. At last OV on 6/11/2020, patient had a left knee cortisone injection. Left knee pain has worsened since last OV. He wants to set up surgery for the left knee. He has an MRI-documented left medial meniscus tear. Pt reports right knee pain X 2 months. Right knee pain is not as bad as left knee. [facility]. The patient also had a cortisone injection on 5/29/19 which provided good relief for around 3 months. Pt reports limping. Has been using a knee brace and has tried taking Aleve. Due to the current findings, affected activity of daily living and continued pain and discomfort, surgical intervention is indicated. The alternatives, risks, and complications, including but not limited to infection, blood loss, need for blood transfusion, neurovascular damage, kaylen-incisional numbness, subcutaneous hematoma, bone fracture, anesthetic complications, DVT, PE, death, RSD, postoperative stiffness and pain, possible surgical scar, delayed healing and nonhealing, reflexive sympathetic dystrophy, damage to blood vessels and nerves, need for more surgery, MI, and stroke,  failure of hardware, gait disturbances,have been discussed. The patient understands and wishes to proceed with surgery. PAST MEDICAL HISTORY:     History reviewed. No pertinent past medical history.     CURRENT MEDICATIONS:     Current Outpatient Medications   Medication Sig Dispense Refill    HYDROcodone-acetaminophen (Norco) 7.5-325 mg per tablet Take 1 Tab by mouth every four (4) hours as needed for Pain for up to 7 days. Max Daily Amount: 6 Tabs. Do not take until after surgery (for acute post operative pain) 40 Tab 0    meloxicam (Mobic) 15 mg tablet Take 1 Tab by mouth daily (with breakfast). 30 Tab 1    naproxen sodium (Aleve) 220 mg tablet Take 220 mg by mouth two (2) times daily (with meals).  diclofenac (VOLTAREN) 1 % gel Apply  to affected area four (4) times daily.  aspirin delayed-release 81 mg tablet Take  by mouth daily.  lisinopriL (PRINIVIL, ZESTRIL) 10 mg tablet Take  by mouth daily.  alogliptin (NESINA) 25 mg tablet Take 25 mg by mouth daily.  traMADol (ULTRAM) 50 mg tablet Take 50 mg by mouth every six (6) hours as needed for Pain.  sAXagliptin (ONGLYZA) 5 mg tab tablet Take  by mouth daily.  glipiZIDE (GLUCOTROL) 10 mg tablet Take 10 mg by mouth two (2) times a day. ALLERGIES:     No Known Allergies      SURGICAL HISTORY:     History reviewed. No pertinent surgical history. SOCIAL HISTORY:     Social History     Socioeconomic History    Marital status:      Spouse name: Not on file    Number of children: Not on file    Years of education: Not on file    Highest education level: Not on file   Tobacco Use    Smoking status: Former Smoker     Types: Cigarettes     Quit date: 1997     Years since quittin.5    Smokeless tobacco: Never Used       FAMILY HISTORY:     History reviewed. No pertinent family history. REVIEW OF SYSTEMS:     Negative for fevers, chills, chest pain, shortness of breath, weight loss, recent illness     General: Negative for fever and chills. No unexpected change in weight. Denies fatigue. No change in appetite. Skin: Negative for rash or itching. HEENT: Negative for congestion, sore throat, neck pain and neck stiffness.  No change in vision or hearing. Hasn't noted any enlarged lymph nodes in the neck. Cardiovascular:  Negative for chest pain and palpitations. Has not noted pedal edema. Respiratory: Negative for cough, colds, sinus, hemoptysis, shortness of breath and wheezing. Gastrointestinal: Negative for nausea and vomiting, rectal bleeding, coffee ground emesis, abdominal pain, diarrhea and constipation. Genitourinary: Negative for dysuria, frequency urgency, or burning on micturition. No flank pain, no foul smelling urine, no difficulty with initiating urination. Hematological: Negative for bleeding or easy bruising. Musculoskeletal: Negative  for arthralgias, back pain or neck pain. Neurological: Negative for dizziness, seizures or syncopal episodes. Denies headaches. Endocrine: Denies excessive thirst.  No heat/cold intolerance. Psychiatric: Negative for depression or insomnia. PHYSICAL EXAMINATION:     VITALS:   Visit Vitals  /72 (BP 1 Location: Right arm, BP Patient Position: Sitting)   Pulse 87   Temp (!) 96.4 °F (35.8 °C) (Temporal)   Resp 16   Ht 5' 9\" (1.753 m)   Wt 308 lb 6.4 oz (139.9 kg)   SpO2 99%   BMI 45.54 kg/m²     GEN:  Well developed, well nourished 45 y.o. male in no acute distress. HEENT: Normocephalic and atraumatic. Eyes: Conjunctivae and EOM are normal.Pupils are equal, round, and reactive to light. External ear normal appearance, external nose normal appearing. Mouth/Throat: Oropharynx is clear and moist, able to handle oral secretions w/out difficulty, airway patent  NECK: Supple. Normal ROM, No lymphadenopathy. Trachea is midline. No bruising, swelling or deformity  RESP: Clear to auscultation bilaterally. No wheezes, rales, rhonchi. Normal effort and breath sounds. No respiratory distress  CARDIO:  Normal rate, regular rhythm and normal heart sounds. No MGR. ABDOMEN: Soft, non-tender, non-distended, normoactive bowel sounds in all four quadrants. There is no tenderness.  There is no rebound and no guarding. BACK: No CVA or spinal tenderness  BREAST:  Deferred  PELVIC:    Deferred   RECTAL:  Deferred   :           Deferred  EXTREMITIES: EXAMINATION OF: left knee  Examination Left knee   Skin Intact   Range of motion 0-130   Effusion +   Medial joint line tenderness +   Lateral joint line tenderness -   Tenderness Pes Bursa -   Tenderness insertion MCL -   Tenderness insertion LCL -   Vernas -   Patella crepitus +   Patella grind -   Lachman -   Pivot shift -   Anterior drawer -   Posterior drawer -   Varus stress -   Valgus stress -   Neurovascular Intact   Calf Swelling and Tenderness to Palpation -   Josue's Test -   Hamstring Cord Tightness         NEUROVASCULAR: Sensation intact to light touch and strength grossly intact and symmetrical. No nystagmus. Positive distal pulses and capillary refill. DVT ASSESSMENT:  There is not  calf tenderness. No evidence of DVT seen on physical exam.  MOTOR: In tact  PSYCH: Alert an oriented to person, place and time. Mood, memory, affect, behavior and judgment normal       RADIOGRAPHS & DIAGNOSTIC STUDIES:     MRI/xray reveals : XR of right knee obtained in the office dated 10/12/2020 was reviewed and read by Dr. Axel Crain: mild degebnerative changes in the patello femoral joint           MRI of left knee from [facility] dated 8/21/19 reviewed and read by Dr. Axel Crain:  IMPRESSION:  1. 1-2 cm horizontal oblique tear posterior horn medial meniscus  2. Anterior cruciate ligament intact. 3. Bicompartmental chondromalacia,. Grade 3-4 patellofemoral compartment and medial compartment.   4. Mild diffuse patellar tendinopathy.         LABS:       @  CBC:   Lab Results   Component Value Date/Time    WBC 12.8 12/30/2020 05:27 PM    RBC 5.21 12/30/2020 05:27 PM    HGB 13.9 12/30/2020 05:27 PM    HCT 43.2 12/30/2020 05:27 PM    PLATELET 295 69/57/1003 05:27 PM    and BMP:   Lab Results   Component Value Date/Time    Glucose 113 (H) 12/30/2020 05:27 PM    Sodium 137 12/30/2020 05:27 PM    Potassium 4.3 12/30/2020 05:27 PM    Chloride 104 12/30/2020 05:27 PM    CO2 26 12/30/2020 05:27 PM    BUN 10 12/30/2020 05:27 PM    Creatinine 0.98 12/30/2020 05:27 PM    Calcium 9.3 12/30/2020 05:27 PM   @    Preoperative labs were reviewed and are substantially within normal limits   EKG: normal EKG, normal sinus rhythm, unchanged from previous tracings. ASSESSMENT:       Encounter Diagnoses   Name Primary?  Tear of medial meniscus of left knee, current, unspecified tear type, subsequent encounter Yes    Primary osteoarthritis of left knee        PLAN:     Again, the alternatives, risks, and complications, as well as expected outcome were discussed. The patient understands and agrees to proceed with left knee arthroscopic partial medial menisectomy. The patient was counseled at length about the risks of sandro Covid-19 during their perioperative period and any recovery window from their procedure. The patient was made aware that sandro Covid-19  may worsen their prognosis for recovering from their procedure and lend to a higher morbidity and/or mortality risk. All material risks, benefits, and reasonable alternatives including postponing the procedure were discussed. The patient does  wish to proceed with the procedure at this time.    Patient given orders listed below:    Orders Placed This Encounter    HYDROcodone-acetaminophen (Norco) 7.5-325 mg per tablet         Haile Diamond PA-C  1/4/2021  2:00 PM

## 2021-01-05 ENCOUNTER — TELEPHONE (OUTPATIENT)
Dept: ORTHOPEDIC SURGERY | Age: 51
End: 2021-01-05

## 2021-01-05 NOTE — TELEPHONE ENCOUNTER
Spoke with Dr. Brittanie Farrell from [facility] and informed her of LOTTIE Mckeon message.

## 2021-01-05 NOTE — TELEPHONE ENCOUNTER
Jalen Higginbotham From the [facility] called stating that the patient brought in a prescription for Norco 7.5-325 but they do not care that dosage in the [address] [facility]. He stated that they do have 5-325 Norco and if the provider is willing to change the prescription to that dosage they will be able to fill it. Genaro did give me the number to be reached at as [phone] ext [phone] and it goes to Dr. Hightower Doing his supervisor who can take the message.

## 2021-01-12 ENCOUNTER — TELEPHONE (OUTPATIENT)
Dept: ORTHOPEDIC SURGERY | Age: 51
End: 2021-01-12

## 2021-01-12 ENCOUNTER — DOCUMENTATION ONLY (OUTPATIENT)
Dept: ORTHOPEDIC SURGERY | Age: 51
End: 2021-01-12

## 2021-01-12 NOTE — PROGRESS NOTES
Patient came into the \[facility]\"" office and dropped off a [place] [employer] Disability and Leave Statement of Incapacity/ Attending Physician Statement to be completed. Patient is aware of the 7-10 business day form policy as well as the form fees. Please advise patient at [phone] when ready to .

## 2021-01-18 ENCOUNTER — DOCUMENTATION ONLY (OUTPATIENT)
Dept: ORTHOPEDIC SURGERY | Age: 51
End: 2021-01-18

## 2021-01-18 NOTE — PROGRESS NOTES
[place] [employer] form completed, copy to scanning, patient advised he may  at [facility] location.

## 2021-01-21 ENCOUNTER — OFFICE VISIT (OUTPATIENT)
Dept: ORTHOPEDIC SURGERY | Age: 51
End: 2021-01-21
Payer: OTHER GOVERNMENT

## 2021-01-21 VITALS — TEMPERATURE: 96.9 F

## 2021-01-21 DIAGNOSIS — S83.242D TEAR OF MEDIAL MENISCUS OF LEFT KNEE, CURRENT, UNSPECIFIED TEAR TYPE, SUBSEQUENT ENCOUNTER: Primary | ICD-10-CM

## 2021-01-21 PROCEDURE — 99024 POSTOP FOLLOW-UP VISIT: CPT | Performed by: PHYSICIAN ASSISTANT

## 2021-01-21 NOTE — PROGRESS NOTES
Patient: Muriel Grande  YOB: 1970       HISTORY:  The patient presents for reevaluation of his left knee status post arthroscopic partial medial menisectomy on 1/14/21. Patient is improved, states pain is a 3 out of 10.  he has not gone to physical therapy. Patient denies any fever, chills, chest pain, shortness of breath or calf pain. The remainder of the review of systems is negative. There are no new illness or injuries to report since last seen in the office. No changes in medications, allergies, social or family history. PHYSICAL EXAMINATION:    Visit Vitals  Temp 96.9 °F (36.1 °C)     The patient is a well-developed, well-nourished male in no acute distress. The patient is alert and oriented times three. The patient appears to be well groomed. Mood and affect are normal.   ORTHOPEDIC EXAM of Left knee: Inspection: Effusion present,  incisions clean, dry intact, sutures in place  TTP: medial joint line  Range of motion: 5-90 flexion  Stability: Stable  Strength: 5/5  2+ distal pulses    IMPRESSION:  Status post Left knee arthroscopic partial medial menisectomy. PLAN: Incisions cleaned. Surgery was discussed at length today. Stressed to patient that nothing causes an increase in pain or swelling. Patient is weight bearing as tolerated. OK to d/c use of crutches/walker. Will set up with physical therapy. Patient does note request refill of pain medication. Patient will follow up in 3 weeks.     [facility]

## 2021-01-27 ENCOUNTER — TELEPHONE (OUTPATIENT)
Dept: ORTHOPEDIC SURGERY | Age: 51
End: 2021-01-27

## 2021-01-27 NOTE — TELEPHONE ENCOUNTER
Patient is requesting physical therapy order be sent to [facility] - patient is already having therapy there.

## 2021-02-04 ENCOUNTER — HOSPITAL ENCOUNTER (OUTPATIENT)
Dept: PHYSICAL THERAPY | Age: 51
Discharge: HOME | End: 2021-02-04
Payer: OTHER GOVERNMENT

## 2021-02-04 PROCEDURE — 97161 PT EVAL LOW COMPLEX 20 MIN: CPT

## 2021-02-04 PROCEDURE — 97110 THERAPEUTIC EXERCISES: CPT

## 2021-02-04 NOTE — PROGRESS NOTES
[facility] at [address] [facility] [facility], [facility]  Phone: [phone]      Fax:  [phone]    Plan of Care/ Statement of Necessity for Physical Therapy Services  Patient name: Michelle Wayne Start of Care: 2021   Referral source: Zelpha Habermann [place] : 1970    Medical Diagnosis: Pain in left knee [M25.562]  Payor: [facility] CCN / Plan: Shani Starcher / Product Type: [employer] Programs /  Onset Date: Sx 21    Treatment Diagnosis: Left knee pain   Prior Hospitalization: see medical history Provider#: [id number]   Medications: Verified on Patient summary List   Comorbidities: DM, OA, Cornia transplant (B) eye,  Prior Level of Function: Long time (B) Knee pain. Able to stand for work 35 - 40 hrs without difficulty     The Plan of Care and following information is based on the information from the initial evaluation. Assessment/ key information: Patient is a 45 y.o. male referred to PT with the above Dx. Patient seen today S/P Left knee MM Repair on 21. Minor pain, just feels funny walking at times. I've been staying off of it other than getting up every hour to stretch it. 3 Flights of stairs at home with a step to gait pattern at times. A little pain with walking and ice makes the knee hurt. Patient reports that he has significant OA in the left knee.       Patient presents to PT with decreased strength, decreased flexibility, and decreased mobility of the Left knee. Patient has a slight lateral tracking of the left patella and edema at the   Patient s/s appear to be consistent w/ diagnosis. Patient demonstrates the potential to make functional gains within a reasonable time frame and will benefit from skilled PT to address impairments and improve functional mobility and strength for an improved quality of life.   Fall Risk Assessment: Patient demonstrates no Fall Risk      Evaluation Complexity History MEDIUM Complexity : 1-2 comorbidities / personal factors will impact the outcome/ POC ; Examination LOW Complexity : 1-2 Standardized tests and measures addressing body structure, function, activity limitation and / or participation in recreation  ;Presentation LOW Complexity : Stable, uncomplicated  ;Clinical Decision Making MEDIUM Complexity : FOTO score of 26-74  Overall Complexity Rating: LOW   Problem List: pain affecting function, decrease ROM, decrease strength, edema affecting function, impaired gait/ balance, decrease ADL/ functional abilitiies, decrease activity tolerance, decrease flexibility/ joint mobility, decrease transfer abilities and other FOTO = 63   Treatment Plan may include any combination of the following: Therapeutic exercise, Therapeutic activities, Neuromuscular re-education, Physical agent/modality, Gait/balance training, Manual therapy, Patient education, Self Care training, Functional mobility training, Home safety training and Stair training  Patient / Family readiness to learn indicated by: asking questions, trying to perform skills and interest  Persons(s) to be included in education: patient (P)  Barriers to Learning/Limitations: None  Patient Goal (s): Being able to get around again with less pain  Patient Self Reported Health Status: fair  Rehabilitation Potential: good    Progress towards goals / Updated goals:  Short Term Goals: To be accomplished in 5 treatments:  1. Pt will be compliant and independent with HEP in order to facilitate PT sessions and aid with self management   Eval Status:  Initiated   Current Status:  2. Pt to tolerate 30 min or more of TE and/or Interventions w/o increased s/s   Eval Status:  Initiated   Current Status:    Long Term Goals: To be accomplished in 10 treatments:  1. Pt will report 50% improvement or better with left knee dysfunction to show a significant increase in ability to tolerate ADL   Eval Status:  Initiated   Current Status:  2.   Pt will ambulate 500' with little to no antalgic gait for progress to a normal gait patter with community ambulation    Eval Status:  Ambulates with a left antalgic gait   Current Status:  3. Pt will ambulate 1/2 mile on TM with little to no increased left knee s/s to return to walking at [facility] during normal weekly shopping     Eval Status:  Limited walking with daily activity   Current Status:  4. Pt will demonstrate the ability for sit - stand x 20 reps with symmetrical stance, no added pain and little to no difficulty     Eval Status:  Initiated   Current Status:  5. Pt will demonstrate the ability to negotiate 32 6\" stairs with no HHA or difficulty for carryover to walking his three flights of steps at home without difficulty    Eval Status:  Initiated   Current Status:  6. Pt will improve FOTO score to 73 in 13 visits to show significant improvement in function for progress to performing normal walking and work activity with little difficulty   Eval Status: 63   Current Status:   Frequency / Duration: Patient to be seen 2-3 times per week for 10 treatments. Patient/ Caregiver education and instruction: Diagnosis, prognosis, self care, activity modification and exercises   [x]  Plan of care has been reviewed with PTA  Comments:  Patient provided w/CATHY rFias, PT 2/4/2021 9:43 AM  _____________________________________________________________________  I certify that the above Therapy Services are being furnished while the patient is under my care. I agree with the treatment plan and certify that this therapy is necessary.     Physician's Signature:____________Date:_________TIME:________    ** Signature, Date and Time must be completed for valid certification **    Please sign and return to [facility] at [address] [facility]  [place], [facility]  Phone: [phone]      Fax:  [phone]

## 2021-02-04 NOTE — PROGRESS NOTES
PT DAILY TREATMENT NOTE/KNEE EVAL     Patient Name: Louisa Valencia  Date:2021  : 1970  [x]  Patient  Verified  Payor: [facility] CCN / Plan: [facility] CCN / Product Type: Federal Funded Programs /    In [facility]  Out time:1040  Total Treatment Time (min): 55  Visit #: 1 of 10    Medicare/BCBS Only   Total Timed Codes (min):    1:1 Treatment Time:          Treatment Area: Pain in left knee [M25.562]      SUBJECTIVE  Pain Level (0-10 scale): 3  []constant [x]intermittent []improving []worsening []no change since onset     Any medication changes, allergies to medications, adverse drug reactions, diagnosis change, or new procedure performed?: [x] No    [] Yes (see summary sheet for update)  Subjective functional status/changes:       Subjective: Patient is a 45 y.o. male referred to PT with the above Dx. Patient seen today S/P Left knee MM Repair on 21. Minor pain, just feels funny walking at times. I've been staying off of it other than getting up every hour to stretch it. 3 Flights of stairs at home with a step to gait pattern at times. A little pain with walking and ice makes the knee hurt. Patient reports that he has significant OA in the left knee. Patient presents to PT with decreased strength, decreased flexibility, and decreased mobility of the Left knee. Patient s/s appear to be consistent w/ diagnosis. Patient demonstrates the potential to make functional gains within a reasonable time frame and will benefit from skilled PT to address impairments and improve functional mobility and strength for an improved quality of life. Fall Risk Assessment: Patient demonstrates no Fall Risk      Mechanism of Injury: Wear and tear, OA. Comorbidities: DM, OA, Cornia transplant (B) eye,  Prior Level of Function: Long time (B) Knee pain.   Able to stand for work 35 - 40 hrs without difficulty    FOTO: 61 / 71 in 13 visits    Goal: Being able to get around again with less pain    Health Status: Fair    What type of work do you do? [place], standing and walking. Living Situation/Domestic Life: Lives at home with wife and kids (I)  Mobility: (I)  Self Care (I)  Stairs in the home? 3 Flights    What type of daily activities/hobbies? Video games, House, Paint, work    Limitations to Activity/Recreation/PLOF: walking stairs normally, walking in the community       Barriers: [x]pain []financial []time []transportation []other    Motivation: High    FABQ Score: []low []elevate    Cognition: A & O x 3    Other:    Risk For Falls:   []No  []low []elevate       OBJECTIVE/EXAMINATION  Demonstrated Balance: Good                 SLS: Right 30  Left  30     Tandem Stance: NT          Romberg:  EO 30   EC 30  Demonstrated Mobility: (I)  Gait: Left antalgic gait with left lateral sway  - Slight Lateral tracking left  - Decr VMO initiation Left as compared to the right  - Minor HHA with walking stairs, pt demo a reciprocal gait pattern with reported pain       AROM PROM Strength Pain? ?    Right Left Right Left Right Left    Hip Flx     4 4    Knee Flx 125    3+ 3+    Knee Ext WNL    5 5-    HS 90/90          SLR                                            20 min [x]Eval                  []Re-Eval         26 min Therapeutic Exercise:  [] See flow sheet : Develop and instruct HEP   Rationale: increase ROM, increase strength, and improve coordination to improve the patients ability to Initiate HEP and improve daily function                                     With   [x] TE   [] TA   [] neuro   [] other: Patient Education: [x] Review HEP    [] Progressed/Changed HEP based on:   [] positioning   [] body mechanics   [] transfers   [] heat/ice application    [] other:       Other Objective/Functional Measures:      Access Code: [email or web address]   URL: [email or web address]   Date: 02/04/2021   Prepared by: Mesfin Ruvalcaba     Exercises   Long Sitting Quad Set - 10 reps - 3 sets - 2x daily - 7x weekly   Supine Knee Extension Strengthening - 10 reps - 3 sets - 2x daily - 7x weekly   Supine Straight Leg Raises - 10 reps - 3 sets - 2x daily - 7x weekly   Step Up - 10 reps - 3 sets - 2x daily - 7x weekly   Sit to Stand - 10 reps - 3 sets - 2x daily - 7x weekly   Mini Squat with Counter Support - 10 reps - 3 sets - 2x daily - 7x weekly   Seated March - 10 reps - 3 sets - 2x daily - 7x weekly   Seated Long Arc Quad - 10 reps - 3 sets - 2x daily - 7x weekly   Seated Knee Flexion Slide - 10 reps - 3 sets - 2x daily - 7x weekly   Standing Alternating Knee Flexion - 10 reps - 3 sets - 1x daily - 7x weekly   Prone Quadriceps Stretch with Strap - 10 reps - 3 sets - 1x daily - 7x weekly   Hooklying Hamstring Stretch with Strap - 10 reps - 3 sets - 1x daily - 7x weekly     Pain Level (0-10 scale) post treatment: 3     ASSESSMENT/Changes in Function:        [x]  See Plan of Care  []  See progress note/recertification  []  See Discharge Summary         Progress towards goals / Updated goals:  Short Term Goals: To be accomplished in 5 treatments:  1. Pt will be compliant and independent with HEP in order to facilitate PT sessions and aid with self management   Eval Status:  Initiated   Current Status:  2. Pt to tolerate 30 min or more of TE and/or Interventions w/o increased s/s   Eval Status:  Initiated   Current Status:    Long Term Goals: To be accomplished in 10 treatments:  1. Pt will report 50% improvement or better with left knee dysfunction to show a significant increase in ability to tolerate ADL   Eval Status:  Initiated   Current Status:  2. Pt will ambulate 500' with little to no antalgic gait for progress to a normal gait patter with community ambulation    Eval Status:  Ambulates with a left antalgic gait   Current Status:  3.   Pt will ambulate 1/2 mile on TM with little to no increased left knee s/s to return to walking at [facility] during normal weekly shopping Eval Status:  Limited walking with daily activity   Current Status:  4. Pt will demonstrate the ability for sit - stand x 20 reps with symmetrical stance, no added pain and little to no difficulty     Eval Status:  Initiated   Current Status:  5. Pt will demonstrate the ability to negotiate 32 6\" stairs with no HHA or difficulty for carryover to walking his three flights of steps at home without difficulty    Eval Status:  Initiated   Current Status:  6.   Pt will improve FOTO score to 73 in 13 visits to show significant improvement in function for progress to performing normal walking and work activity with little difficulty   Eval Status: 63   Current Status:     PLAN  [x]  Upgrade activities as tolerated     [x]  Continue plan of care  []  Update interventions per flow sheet       []  Discharge due to:_  []  Other:_      Reinier Thomas, PT 2/4/2021  9:44 AM

## 2021-02-08 ENCOUNTER — HOSPITAL ENCOUNTER (OUTPATIENT)
Dept: PHYSICAL THERAPY | Age: 51
Discharge: HOME | End: 2021-02-08
Payer: OTHER GOVERNMENT

## 2021-02-08 PROCEDURE — 97035 APP MDLTY 1+ULTRASOUND EA 15: CPT

## 2021-02-08 PROCEDURE — 97110 THERAPEUTIC EXERCISES: CPT

## 2021-02-08 NOTE — PROGRESS NOTES
PT DAILY TREATMENT NOTE     Patient Name: Jaz Hopkins  Date:2021  : 1970  [x]  Patient  Verified  Payor: [facility] CCN / Plan: [facility] CCN / Product Type: Federal Funded Programs /    In time:11:04  Out time:11:54  Total Treatment Time (min): 50  Visit #: 2 of 10    Treatment Area: Pain in left knee [M25.562]    SUBJECTIVE  Pain Level (0-10 scale): 3  Any medication changes, allergies to medications, adverse drug reactions, diagnosis change, or new procedure performed?: [x] No    [] Yes (see summary sheet for update)  Subjective functional status/changes:   [] No changes reported  Pt reports usual pain today (3/10). States he performed his HEP once daily since  except .     OBJECTIVE    Modality rationale: decrease edema, decrease inflammation, decrease pain, increase tissue extensibility and increase muscle contraction/control to improve the patients ability to ambulate with less pain   Min Type Additional Details    [] Estim:  []Unatt       []IFC  []Premod                        []Other:  []w/ice   []w/heat  Position:  Location:    [] Estim: []Att    []TENS instruct  []NMES                    []Other:  []w/US   []w/ice   []w/heat  Position:  Location:    []  Traction: [] Cervical       []Lumbar                       [] Prone          []Supine                       []Intermittent   []Continuous Lbs:  [] before manual  [] after manual   8 [x]  Ultrasound: [x]Continuous   [] Pulsed                           []1MHz   [x]3MHz W/cm2:0.8  Location: left knee cap, around patella (focus on medial patella)    []  Iontophoresis with dexamethasone         Location: [] Take home patch   [] In clinic    []  Ice     []  heat  []  Ice massage  []  Laser   []  Anodyne Position:  Location:    []  Laser with stim  []  Other:  Position:  Location:    []  Vasopneumatic Device Pressure:       [] lo [] med [] hi   Temperature: [] lo [] med [] hi   [x] Skin assessment post-treatment:  [x]intact []redness- no adverse reaction    []redness - adverse reaction:     42 min Therapeutic Exercise:  [x] See flow sheet : strengthening, stretching   Rationale: increase ROM, increase strength, improve coordination and improve balance to improve the patients ability to perform ADLs safely          With   [x] TE   [] TA   [] neuro   [] other: Patient Education: [x] Review HEP    [] Progressed/Changed HEP based on:   [x] positioning   [x] body mechanics   [] transfers   [x] heat/ice application    [] other:      Other Objective/Functional Measures:   - left VMO less activation vs right VMO when palpated   - some pain with minisquats and sit-stands     Pain Level (0-10 scale) post treatment: 3    ASSESSMENT/Changes in Function:   Pt performed well overall. Focused on VMO activation for patellar stabilization and pain reduction, and stretching for increased tissue extensibility. VC for engaging quads during decent of sit-stands (vs pt \"plopping\" down). REC at NV: 1) VMO and HS strengthening    Patient will continue to benefit from skilled PT services to modify and progress therapeutic interventions, address functional mobility deficits, address ROM deficits, address strength deficits, analyze and address soft tissue restrictions, analyze and cue movement patterns, analyze and modify body mechanics/ergonomics, assess and modify postural abnormalities, address imbalance/dizziness and instruct in home and community integration to attain remaining goals. []  See Plan of Care  []  See progress note/recertification  []  See Discharge Summary         Progress towards goals / Updated goals:  Short Term Goals: To be accomplished in 5 treatments:  1.   Pt will be compliant and independent with HEP in order to facilitate PT sessions and aid with self management             Eval Status:  Initiated             Current Status: Progressing - performed HEP once daily since eval (minus Sunday) (2/8/2021)  2. Pt to tolerate 30 min or more of TE and/or Interventions w/o increased s/s             Eval Status:  Initiated             Current Status: Progressing - pain with wt bearing exercises (e.g. mini squats) (2/8/2021)    Long Term Goals: To be accomplished in 10 treatments:  1. Pt will report 50% improvement or better with left knee dysfunction to show a significant increase in ability to tolerate ADL             Eval Status:  Initiated             Current Status:  2. Pt will ambulate 500' with little to no antalgic gait for progress to a normal gait patter with community ambulation              Eval Status:  Ambulates with a left antalgic gait             Current Status: Observed pt slow gait, favoring right LE (2/8/2021)  3. Pt will ambulate 1/2 mile on TM with little to no increased left knee s/s to return to walking at [facility] during normal weekly shopping               Eval Status:  Limited walking with daily activity             Current Status:  4. Pt will demonstrate the ability for sit - stand x 20 reps with symmetrical stance, no added pain and little to no difficulty               Eval Status:  Initiated             Current Status: Pt favored right LE during STS with less quad control until cued (2/8/2021)  5. Pt will demonstrate the ability to negotiate 32 6\" stairs with no HHA or difficulty for carryover to walking his three flights of steps at home without difficulty                  Eval Status:  Initiated             Current Status:  6. Pt will improve FOTO score to 73 in 13 visits to show significant improvement in function for progress to performing normal walking and work activity with little difficulty             Eval Status: 63             Current Status:      PLAN  [x]?   Upgrade activities as tolerated     [x]?   Continue plan of care  []?  Update interventions per flow sheet       []?  Discharge due to:_  []?  Other:_       JENNIFER Singletary 2/8/2021  10:34 AM    Future Appointments   Date Time Provider Rajiv Wallis   2/8/2021 11:00 AM Julio César Davis, [place] [facility]   2/11/2021  9:45 AM LOTTIE Durbin VS BS AMB   2/11/2021 12:30 PM Santiago Smoker, [place] [facility]   2/15/2021 11:00 AM Julio César Davis, [place] [facility]   2/18/2021  2:45 PM Santiago Smoker, [place] [facility]   2/22/2021  2:00 PM Santiago Smoker, [place] [facility]   2/25/2021  2:45 PM Julio César Davis, PT [facility]   3/1/2021  2:00 PM Santiago Smoker, [place] [facility]   3/4/2021  2:00 PM Santiago Smoker, [place] [facility]   3/8/2021  2:00 PM Julio César Davis, PT [facility]

## 2021-02-11 ENCOUNTER — HOSPITAL ENCOUNTER (OUTPATIENT)
Dept: PHYSICAL THERAPY | Age: 51
Discharge: HOME | End: 2021-02-11
Payer: OTHER GOVERNMENT

## 2021-02-11 ENCOUNTER — OFFICE VISIT (OUTPATIENT)
Dept: ORTHOPEDIC SURGERY | Age: 51
End: 2021-02-11
Payer: OTHER GOVERNMENT

## 2021-02-11 VITALS
WEIGHT: 306 LBS | SYSTOLIC BLOOD PRESSURE: 123 MMHG | OXYGEN SATURATION: 100 % | DIASTOLIC BLOOD PRESSURE: 74 MMHG | RESPIRATION RATE: 16 BRPM | HEART RATE: 84 BPM | TEMPERATURE: 97 F | BODY MASS INDEX: 45.32 KG/M2 | HEIGHT: 69 IN

## 2021-02-11 DIAGNOSIS — S83.242D TEAR OF MEDIAL MENISCUS OF LEFT KNEE, CURRENT, UNSPECIFIED TEAR TYPE, SUBSEQUENT ENCOUNTER: Primary | ICD-10-CM

## 2021-02-11 DIAGNOSIS — M17.12 PRIMARY OSTEOARTHRITIS OF LEFT KNEE: ICD-10-CM

## 2021-02-11 PROCEDURE — 97110 THERAPEUTIC EXERCISES: CPT

## 2021-02-11 PROCEDURE — 97530 THERAPEUTIC ACTIVITIES: CPT

## 2021-02-11 PROCEDURE — 99024 POSTOP FOLLOW-UP VISIT: CPT | Performed by: PHYSICIAN ASSISTANT

## 2021-02-11 NOTE — LETTER
NOTIFICATION RETURN TO WORK / SCHOOL 
 
2/11/2021 10:27 AM 
 
[facility] [address] [facility] [address] To Whom It May Concern: 
 
Cate Laguerre is currently under the care of [address] [place], [place] [address]mary jo [place]. He will return to work on: 2/25/21 with the following restrictions. No walking or standing longer than 30 mins at time. Must take a 5-10 min sedentary break every 30 mins. This will be x 2 weeks until reevaluated. If there are questions or concerns please have the patient contact our office. Sincerely, LOTTIE Ortega

## 2021-02-11 NOTE — PROGRESS NOTES
PT DAILY TREATMENT NOTE     Patient Name: Jf Scott  Date:2021  : 1970  [x]  Patient  Verified  Payor: [facility] CCN / Plan: Shira Leonardo / Product Type: Federal Funded Programs /    In time: 9344  Out time: 5121  Total Treatment Time (min): 55  Visit #: 3 of 10    Treatment Area: Pain in left knee [M25.562]    SUBJECTIVE  Pain Level (0-10 scale): 3/10 \"I think its the weather\"  Any medication changes, allergies to medications, adverse drug reactions, diagnosis change, or new procedure performed?: [x] No    [] Yes (see summary sheet for update)  Subjective functional status/changes:   [x] No changes reported    OBJECTIVE    38 min Therapeutic Exercise:  [x] See flow sheet : mobility and strengthening   Rationale: increase ROM and increase strength to improve the patients ability to  perform ADLs safely    8 min Therapeutic Activity:  [x]  See flow sheet : sit <> stand   Rationale: increase ROM, increase strength and improve coordination  to improve the patients ability to return to functional movements          With   [x] TE   [] TA   [] neuro   [] other: Patient Education: [x] Review HEP    [] Progressed/Changed HEP based on:   [] positioning   [] body mechanics   [] transfers   [] heat/ice application    [] other:      Other Objective/Functional Measures:   --increased reps on most therex with good tolerance     Pain Level (0-10 scale) post treatment: 3/10    ASSESSMENT/Changes in Function: Pt responds well to familiar activities at today's session, describing pain as being weather related instead of due to surgery. Pt reports no soreness following previous session.      Patient will continue to benefit from skilled PT services to modify and progress therapeutic interventions, address functional mobility deficits, address ROM deficits, address strength deficits, analyze and address soft tissue restrictions, analyze and cue movement patterns and analyze and modify body mechanics/ergonomics to attain remaining goals.      []  See Plan of Care  []  See progress note/recertification  []  See Discharge Summary         Progress towards goals / Updated goals:  Short Term Goals: To be accomplished in 5 treatments:  1.  Pt will be compliant and independent with HEP in order to facilitate PT sessions and aid with self management             Eval Status:  Initiated             Current Status: Progressing pt reports doing some of the exercises everyday 2/11/21  2.  Pt to tolerate 30 min or more of TE and/or Interventions w/o increased s/s             Eval Status:  Initiated             Current Status: MET, pt reports no increased pain from baseline today with 45 mins 2/11/21     Long Term Goals: To be accomplished in 10 treatments:  1.  Pt will report 50% improvement or better with left knee dysfunction to show a significant increase in ability to tolerate ADL             Eval Status:  Initiated             Current Status: Pt reports no subjective improvement since start of POC 2/11/21  2.  Pt will ambulate 500' with little to no antalgic gait for progress to a normal gait patter with community ambulation              Eval Status:  Ambulates with a left antalgic gait             Current Status: Observed pt slow gait, favoring right LE (2/8/2021)  3.  Pt will ambulate 1/2 mile on TM with little to no increased left knee s/s to return to walking at [facility] during normal weekly shopping               Eval Status:  Limited walking with daily activity             Current Status:  4.  Pt will demonstrate the ability for sit - stand x 20 reps with symmetrical stance, no added pain and little to no difficulty               Eval Status:  Initiated             Current Status: Pt favored right LE during STS with less quad control until cued (2/8/2021)  5.  Pt will demonstrate the ability to negotiate 32 6\" stairs with no HHA or difficulty for carryover to walking his three flights of steps at home without difficulty                  Eval Status:  Initiated             Current Status:  6.  Pt will improve FOTO score to 73 in 13 visits to show significant improvement in function for progress to performing normal walking and work activity with little difficulty             Eval Status: 63             Current Status:     PLAN  [x]  Upgrade activities as tolerated     [x]  Continue plan of care  []  Update interventions per flow sheet       []  Discharge due to:_  []  Other:_      Teresa Lemos, PTA 2/11/2021  12:34 PM    Future Appointments   Date Time Provider Rajiv Wallis   2/15/2021 11:00 AM [address] 1 [facility]   2/18/2021  2:45 PM Charle Khat, [place] [facility]   2/22/2021  2:00 PM Charle Khat, [place] [facility]   2/25/2021  2:45 PM Natividad Dai, PT [facility]   3/1/2021  2:00 PM Charle Khat, [place] [facility]   3/4/2021  2:00 PM Charle Khat, [place] [facility]   3/8/2021  2:00 PM Natividad Dai PT [facility]   3/11/2021 10:15 AM Ashley Jimenez PA [facility]

## 2021-02-11 NOTE — PROGRESS NOTES
Patient: Misael Mustafa  YOB: 1970       HISTORY:  The patient presents for reevaluation of his left knee status post arthroscopic partial medial menisectomy with grade IV chondromalaica on 1/14/21. Patient is improved, states pain is a 3 out of 10.  he has gone to physical therapy. Just notes occasional soreness. Patient denies any fever, chills, chest pain, shortness of breath or calf pain. The remainder of the review of systems is negative. There are no new illness or injuries to report since last seen in the office. No changes in medications, allergies, social or family history. PHYSICAL EXAMINATION:    Visit Vitals  /74 (BP 1 Location: Right upper arm)   Pulse 84   Temp 97 °F (36.1 °C)   Resp 16   Ht 5' 9\" (1.753 m)   Wt 306 lb (138.8 kg)   SpO2 100%   BMI 45.19 kg/m²     The patient is a well-developed, well-nourished male in no acute distress. The patient is alert and oriented times three. The patient appears to be well groomed. Mood and affect are normal.   ORTHOPEDIC EXAM of Left knee: Inspection: Effusion not present,  incisions well healed  TTP: medial joint line  Range of motion: 0-120 flexion  Stability: Stable  Strength: 5/5  2+ distal pulses    IMPRESSION:  Status post Left knee arthroscopic partial medial menisectomy with degenerative arthritis with joint space narrowing    PLAN:  1. Patient cont to improve post operatively  2. Will cont with PT gradually increasing his activities  3.  Will return to work limited duty in 2 weeks    RTC 4 weeks    Sonia Rodriguez 150 and SIRISHA

## 2021-02-15 ENCOUNTER — HOSPITAL ENCOUNTER (OUTPATIENT)
Dept: PHYSICAL THERAPY | Age: 51
Discharge: HOME | End: 2021-02-15
Payer: OTHER GOVERNMENT

## 2021-02-15 PROCEDURE — 97530 THERAPEUTIC ACTIVITIES: CPT

## 2021-02-15 PROCEDURE — 97110 THERAPEUTIC EXERCISES: CPT

## 2021-02-15 NOTE — PROGRESS NOTES
PT DAILY TREATMENT NOTE     Patient Name: Rey Scott  Date:2/15/2021  : 1970  [x]  Patient  Verified  Payor: [facility] CCN / Plan: Eligio Sessions / Product Type: Federal Funded Programs /    In time:   Out time: 835  Total Treatment Time (min): 55  Visit #: 4 of 10    Treatment Area: Pain in left knee [M25.562]    SUBJECTIVE  Pain Level (0-10 scale): 3/10 \"It's always a 3/10, that's my everyday baseline\"  Any medication changes, allergies to medications, adverse drug reactions, diagnosis change, or new procedure performed?: [x] No    [] Yes (see summary sheet for update)  Subjective functional status/changes:   [x] No changes reported  Pt notes he \"bumped\" his knee in the bathroom but it is not causing added pain    OBJECTIVE    38 Min Therapeutic Exercise:  [x] See flow sheet : mobility and strengthening   Rationale: increase ROM and increase strength to improve the patients ability to perform ADLs safely    8 min Therapeutic Activity:  [x]  See flow sheet : sit <> stand, stepdown   Rationale: increase strength, improve coordination and improve balance  to improve the patients ability to return to functional movements          With   [x] TE   [] TA   [] neuro   [] other: Patient Education: [x] Review HEP    [] Progressed/Changed HEP based on:   [] positioning   [] body mechanics   [] transfers   [] heat/ice application    [] other:      Other Objective/Functional Measures:   --increased pain with sit <> stand (worse on concentric)   --initiated lateral tap down, \"cracking\" on L with this    Pain Level (0-10 scale) post treatment: 3-4/10    ASSESSMENT/Changes in Function: Pt responds well to session today despite increased symptoms. Pt is highly motivated to improve in function and is consistent with home movements.      Patient will continue to benefit from skilled PT services to modify and progress therapeutic interventions, address functional mobility deficits, address ROM deficits, address strength deficits, analyze and address soft tissue restrictions, analyze and cue movement patterns and analyze and modify body mechanics/ergonomics to attain remaining goals.      []  See Plan of Care  []  See progress note/recertification  []  See Discharge Summary         Progress towards goals / Updated goals:  Short Term Goals: To be accomplished in 5 treatments:  1.  Pt will be compliant and independent with HEP in order to facilitate PT sessions and aid with self management             Eval Status:  Initiated             Current Status: MET, pt completes some to all of HEP daily 2/15/21  2.  Pt to tolerate 30 min or more of TE and/or Interventions w/o increased s/s             Eval Status:  Initiated             Current Status: MET, pt reports no increased pain from baseline today with 45 mins 2/11/21      Long Term Goals: To be accomplished in 10 treatments:  1.  Pt will report 50% improvement or better with left knee dysfunction to show a significant increase in ability to tolerate ADL             Eval Status:  Initiated             Current Status: Pt reports no subjective improvement since start of POC 2/11/21  2.  Pt will ambulate 500' with little to no antalgic gait for progress to a normal gait patter with community ambulation              Eval Status:  Ambulates with a left antalgic gait             Current Status: Observed pt slow gait, favoring right LE (2/8/2021)  3.  Pt will ambulate 1/2 mile on TM with little to no increased left knee s/s to return to walking at [place] during normal weekly shopping               Eval Status:  Limited walking with daily activity             Current Status:  4.  Pt will demonstrate the ability for sit - stand x 20 reps with symmetrical stance, no added pain and little to no difficulty               Eval Status:  Initiated             Current Status: Pt favored right LE during STS with less quad control until cued (2/8/2021)  5.  Pt will demonstrate the ability to negotiate 32 6\" stairs with no HHA or difficulty for carryover to walking his three flights of steps at home without difficulty                  Eval Status:  Initiated             Current Status:  6.  Pt will improve FOTO score to 73 in 13 visits to show significant improvement in function for progress to performing normal walking and work activity with little difficulty             Eval Status: 63             Current Status:     PLAN  [x]  Upgrade activities as tolerated     [x]  Continue plan of care  []  Update interventions per flow sheet       []  Discharge due to:_  []  Other:_      Gume Gabriel, PTA 2/15/2021  11:16 AM    Future Appointments   Date Time Provider Rajiv Wallis   2/18/2021  2:45 PM Santiago Smoker, PTA [facility]   2/22/2021  2:00 PM Santiago Smoker, [place] [facility]   2/25/2021  2:45 PM Julio César Davis, PT [facility]   3/1/2021  2:00 PM Santiago Smoker, [place] [facility]   3/4/2021  2:00 PM Santiago Smoker, [place] [facility]   3/8/2021  2:00 PM Julio César Davis, PT [facility]   3/11/2021 10:15 AM Nolberto Marrufo PA [facility]

## 2021-02-18 ENCOUNTER — HOSPITAL ENCOUNTER (OUTPATIENT)
Dept: PHYSICAL THERAPY | Age: 51
Discharge: HOME | End: 2021-02-18
Payer: OTHER GOVERNMENT

## 2021-02-18 PROCEDURE — 97530 THERAPEUTIC ACTIVITIES: CPT

## 2021-02-18 PROCEDURE — 97110 THERAPEUTIC EXERCISES: CPT

## 2021-02-18 NOTE — PROGRESS NOTES
PT DAILY TREATMENT NOTE     Patient Name: Daksha Scott  Date:2021  : 1970  [x]  Patient  Verified  Payor: [employer] / Plan: José Antonio Fiddler / Product Type: Federal Funded Programs /    In time: 6575 Out time: 1603  Total Treatment Time (min): 53  Visit #: 5 of 10    Medicare/BCBS Only   Total Timed Codes (min):  53 1:1 Treatment Time:  53     Treatment Area: Pain in left knee [M25.562]    SUBJECTIVE  Pain Level (0-10 scale): 3/10 \"the weather isn't helping\"  Any medication changes, allergies to medications, adverse drug reactions, diagnosis change, or new procedure performed?: [x] No    [] Yes (see summary sheet for update)  Subjective functional status/changes:   [x] No changes reported    OBJECTIVE    38 Min Therapeutic Exercise:  [x]? See flow sheet : mobility and strengthening   Rationale: increase ROM and increase strength to improve the patients ability to perform ADLs safely     15 min Therapeutic Activity:  [x]? See flow sheet : sit <> stand, stepdown   Rationale: increase strength, improve coordination and improve balance  to improve the patients ability to return to functional movements          With   [x] TE   [] TA   [] neuro   [] other: Patient Education: [x] Review HEP    [] Progressed/Changed HEP based on:   [x] positioning   [] body mechanics   [] transfers   [] heat/ice application    [] other:      Other Objective/Functional Measures:   --initiated box step ups 6\" 10x alex no HHA   --symmetrical use of LEs in sit <> stand, minor pain  --focus on slow and concentric/eccentric movements     Pain Level (0-10 scale) post treatment: 3/10 \"sore\"    ASSESSMENT/Changes in Function: Pt responds well to session today, stating he actually feels \"a little looser\" following. Plan to continue progressing functional activities as tolerated. Pt is progressing well towards goals.      Patient will continue to benefit from skilled PT services to modify and progress therapeutic interventions, address functional mobility deficits, address ROM deficits, address strength deficits, analyze and address soft tissue restrictions, analyze and cue movement patterns and analyze and modify body mechanics/ergonomics to attain remaining goals.      []  See Plan of Care  []  See progress note/recertification  []  See Discharge Summary         Progress towards goals / Updated goals:  Short Term Goals: To be accomplished in 5 treatments:  1.  Pt will be compliant and independent with HEP in order to facilitate PT sessions and aid with self management             Eval Status:  Initiated             Current Status: MET, pt completes some to all of HEP daily 2/15/21  2.  Pt to tolerate 30 min or more of TE and/or Interventions w/o increased s/s             Eval Status:  Initiated             Current Status: MET, pt reports no increased pain from baseline today with 45 mins 2/11/21      Long Term Goals: To be accomplished in 10 treatments:  1.  Pt will report 50% improvement or better with left knee dysfunction to show a significant increase in ability to tolerate ADL             Eval Status:  Initiated             Current Status: Pt reports no subjective improvement since start of POC 2/11/21  2.  Pt will ambulate 500' with little to no antalgic gait for progress to a normal gait patter with community ambulation              Eval Status:  Ambulates with a left antalgic gait             Current Status: Progressing, observed more normalized gait today 2/18/21  3.  Pt will ambulate 1/2 mile on TM with little to no increased left knee s/s to return to walking at [facility] during normal weekly shopping               Eval Status:  Limited walking with daily activity             Current Status:  4.  Pt will demonstrate the ability for sit - stand x 20 reps with symmetrical stance, no added pain and little to no difficulty               Eval Status:  Initiated             Current Status: Progressing, pt completed 2x10 with symmetrical stance, noting slight pain 2/18/21  5.  Pt will demonstrate the ability to negotiate 32 6\" stairs with no HHA or difficulty for carryover to walking his three flights of steps at home without difficulty                  Eval Status:  Initiated             Current Status: Progressing, initiated step ups/down from 6\" step with no HHA today, 10 reps each leg 2/18/21  6.  Pt will improve FOTO score to 73 in 13 visits to show significant improvement in function for progress to performing normal walking and work activity with little difficulty             Eval Status: 63              Current Status:     PLAN  []  Upgrade activities as tolerated     [x]  Continue plan of care  []  Update interventions per flow sheet       []  Discharge due to:_  []  Other:_      Lloyd Dickerson, PTA 2/18/2021  3:01 PM    Future Appointments   Date Time Provider Rajiv Wallis   2/22/2021  2:00 PM Maria E Carrasquillo, [place] [facility]   2/25/2021  2:45 PM Antoine Villegas, PT [facility]   3/1/2021  2:00 PM Maria E Carrasquillo [place] [facility]   3/4/2021  2:00 PM Maria E Carrasquillo, [place] [facility]   3/8/2021  2:00 PM Antoine Villegas, PT [facility]   3/11/2021 10:15 AM Lucie Rose PA [facility] AMB

## 2021-02-22 ENCOUNTER — HOSPITAL ENCOUNTER (OUTPATIENT)
Dept: PHYSICAL THERAPY | Age: 51
Discharge: HOME | End: 2021-02-22
Payer: OTHER GOVERNMENT

## 2021-02-22 PROCEDURE — 97110 THERAPEUTIC EXERCISES: CPT

## 2021-02-22 PROCEDURE — 97530 THERAPEUTIC ACTIVITIES: CPT

## 2021-02-22 NOTE — PROGRESS NOTES
PT DAILY TREATMENT NOTE     Patient Name: Delma Scott  Date:2021  : 1970  [x]  Patient  Verified  Payor: Stan Hussein / Plan: Tia Self / Product Type: Federal Funded Programs /    In time: 7316  Out time: 6488  Total Treatment Time (min): 44  Visit #: 6  10    Treatment Area: Pain in left knee [M25.562]    SUBJECTIVE  Pain Level (0-10 scale): 3/10  Any medication changes, allergies to medications, adverse drug reactions, diagnosis change, or new procedure performed?: [x] No    [] Yes (see summary sheet for update)  Subjective functional status/changes:   [x] No changes reported    OBJECTIVE    29 Min Therapeutic Exercise:  [x]? ? See flow sheet : mobility and strengthening   Rationale: increase ROM and increase strength to improve the patients ability to perform ADLs safely     15 min Therapeutic Activity:  [x]? ?  See flow sheet : sit <> stand, stepdown   Rationale: increase strength, improve coordination and improve balance  to improve the patients ability to return to functional movements          With   [x] TE   [] TA   [] neuro   [] other: Patient Education: [x] Review HEP    [] Progressed/Changed HEP based on:   [] positioning   [] body mechanics   [] transfers   [] heat/ice application    [] other:      Other Objective/Functional Measures:    --15X step ups 6\" no HHA  --initiated leg press #60 for 2x10    Pain Level (0-10 scale) post treatment: 3/10    ASSESSMENT/Changes in Function: Pt reports slight improvement with decreased pain with prolonged standing and overall function, notes 20% improvement since SOC. Pt is progressing slowly towards goals.      Patient will continue to benefit from skilled PT services to modify and progress therapeutic interventions, address functional mobility deficits, address ROM deficits, address strength deficits, analyze and address soft tissue restrictions, analyze and cue movement patterns and analyze and modify body mechanics/ergonomics to attain remaining goals.      []  See Plan of Care  []  See progress note/recertification  []  See Discharge Summary         Progress towards goals / Updated goals:  Short Term Goals: To be accomplished in 5 treatments:  1.  Pt will be compliant and independent with HEP in order to facilitate PT sessions and aid with self management             Eval Status:  Initiated             Current Status: MET, pt completes some to all of HEP daily 2/15/21  2.  Pt to tolerate 30 min or more of TE and/or Interventions w/o increased s/s             Eval Status:  Initiated             Current Status: MET, pt reports no increased pain from baseline today with 45 mins 2/11/21      Long Term Goals: To be accomplished in 10 treatments:  1.  Pt will report 50% improvement or better with left knee dysfunction to show a significant increase in ability to tolerate ADL             Eval Status:  Initiated             Current Status: Pt reports 20% improvement today since [facility] 2/22/21  2.  Pt will ambulate 500' with little to no antalgic gait for progress to a normal gait patter with community ambulation              Eval Status:  Ambulates with a left antalgic gait             Current Status: Progressing, observed more normalized gait today 2/18/21  3.  Pt will ambulate 1/2 mile on TM with little to no increased left knee s/s to return to walking at [facility] during normal weekly shopping               Eval Status:  Limited walking with daily activity             Current Status:  4.  Pt will demonstrate the ability for sit - stand x 20 reps with symmetrical stance, no added pain and little to no difficulty               Eval Status:  Initiated             Current Status: Progressing, pt completed 2x10 with symmetrical stance, noting slight pain 2/18/21  5.  Pt will demonstrate the ability to negotiate 32 6\" stairs with no HHA or difficulty for carryover to walking his three flights of steps at home without difficulty                  Eval Status:  Initiated              Current Status: Progressing, initiated step ups/down from 6\" step with no HHA today, 15 reps each leg 2/22/21  6.  Pt will improve FOTO score to 73 in 13 visits to show significant improvement in function for progress to performing normal walking and work activity with little difficulty             Eval Status: 63              Current Status:     PLAN  [x]  Upgrade activities as tolerated     [x]  Continue plan of care  []  Update interventions per flow sheet       []  Discharge due to:_  []  Other:_      Leigh Ann, JYOTI 2/22/2021  2:09 PM    Future Appointments   Date Time Provider Rajiv Wallis   2/25/2021  2:45 PM Evita Toney, PT [facility]   3/1/2021  2:00 PM Malia Levering, [place] [facility]   3/4/2021  2:00 PM Malia Levering, [place] [facility]   3/8/2021  2:00 PM Evita Toney, PT [facility]   3/11/2021 10:15 AM Aretha Youngblood PA [facility] BS AMB

## 2021-02-25 ENCOUNTER — HOSPITAL ENCOUNTER (OUTPATIENT)
Dept: PHYSICAL THERAPY | Age: 51
Discharge: HOME | End: 2021-02-25
Payer: OTHER GOVERNMENT

## 2021-02-25 PROCEDURE — 97530 THERAPEUTIC ACTIVITIES: CPT

## 2021-02-25 PROCEDURE — 97110 THERAPEUTIC EXERCISES: CPT

## 2021-02-25 PROCEDURE — 97035 APP MDLTY 1+ULTRASOUND EA 15: CPT

## 2021-02-25 NOTE — PROGRESS NOTES
PT DAILY TREATMENT NOTE     Patient Name: Yuliya Scott  Date:2021  : 1970  [x]  Patient  Verified  Payor: [facility] CCN / Plan: [facility] CCN / Product Type: Federal Funded Programs /    In time:2:49  Out time:3:31  Total Treatment Time (min): 42  Visit #: 7 of 10    Medicare/BCBS Only   Total Timed Codes (min):    1:1 Treatment Time:          Treatment Area: Pain in left knee [M25.562]    SUBJECTIVE  Pain Level (0-10 scale): 3  Any medication changes, allergies to medications, adverse drug reactions, diagnosis change, or new procedure performed?: [x] No    [] Yes (see summary sheet for update)  Subjective functional status/changes:   [] No changes reported  Pain was worse yesterday but a little better today. Pain across the middle of the patella. Reports doing exercises at least once a day.     OBJECTIVE    Modality rationale: decrease inflammation, decrease pain and increase tissue extensibility to improve the patients ability to perform usual activity   Min Type Additional Details    [] Estim:  []Unatt       []IFC  []Premod                        []Other:  []w/ice   []w/heat  Position:  Location:    [] Estim: []Att    []TENS instruct  []NMES                    []Other:  []w/US   []w/ice   []w/heat  Position:  Location:    []  Traction: [] Cervical       []Lumbar                       [] Prone          []Supine                       []Intermittent   []Continuous Lbs:  [] before manual  [] after manual   8 [x]  Ultrasound: []Continuous   [] Pulsed                           []1MHz   []3MHz Location: Left Knee  W/cm2: 1.3    []  Iontophoresis with dexamethasone         Location: [] Take home patch   [] In clinic    []  Ice     []  heat  []  Ice massage  []  Laser   []  Anodyne Position:  Location:    []  Laser with stim  []  Other: Position:  Location:    []  Vasopneumatic Device Pressure:       [] lo [] med [] hi   Temperature: [] lo [] med [] hi   [x] Skin assessment post-treatment:  [x]intact []redness- no adverse reaction    []redness - adverse reaction:     24 min Therapeutic Exercise:  [x] See flow sheet :   Rationale: increase ROM, increase strength and improve coordination to improve the patients ability to perform usual activity    10 min Therapeutic Activity:  []  See flow sheet :   Rationale: increase ROM, increase strength and improve coordination  to improve the patients ability to return to normal home life           With   [x] TE   [x] TA   [] neuro   [] other: Patient Education: [x] Review HEP    [] Progressed/Changed HEP based on:   [] positioning   [] body mechanics   [] transfers   [] heat/ice application    [] other:      Other Objective/Functional Measures:   - MMT Left Knee Flx/Ext 5-/5  - Initiate step up to 8\" step  - Reports of pain with activity at the top and medial portion of the left patella    Pain Level (0-10 scale) post treatment: 2    ASSESSMENT/Changes in Function:   - Pt fearful of performing max MMT but demonstrates good left knee strength  - Good progress with step up to 8\" step with no added discomfort  - Good response to US with reports of decreased pain after treatment     Patient will continue to benefit from skilled PT services to modify and progress therapeutic interventions, address functional mobility deficits, address ROM deficits, address strength deficits, analyze and address soft tissue restrictions and analyze and cue movement patterns to attain remaining goals.      []  See Plan of Care  []  See progress note/recertification  []  See Discharge Summary                Progress towards goals / Updated goals:  Short Term Goals: To be accomplished in 5 treatments:  1.  Pt will be compliant and independent with HEP in order to facilitate PT sessions and aid with self management             Eval Status:  Initiated             Current Status: MET, pt completes some to all of HEP daily 2/15/21  2.  Pt to tolerate 30 min or more of TE and/or Interventions w/o increased s/s             Eval Status:  Initiated             Current Status: MET, pt reports no increased pain from baseline today with 45 mins 2/11/21      Long Term Goals: To be accomplished in 10 treatments:  1.  Pt will report 50% improvement or better with left knee dysfunction to show a significant increase in ability to tolerate ADL             Eval Status:  Initiated             Current Status: Pt reports 20% improvement today since [facility] 2/22/21  2.  Pt will ambulate 500' with little to no antalgic gait for progress to a normal gait patter with community ambulation              Eval Status:  Ambulates with a left antalgic gait             Current Status: Progressing with a more normalized gait today 2/25/21  3.  Pt will ambulate 1/2 mile on TM with little to no increased left knee s/s to return to walking at [employer] during normal weekly shopping               Eval Status:  Limited walking with daily activity             Current Status:  4.  Pt will demonstrate the ability for sit - stand x 20 reps with symmetrical stance, no added pain and little to no difficulty               Eval Status:  Initiated             Current Status: Progressing at 3x10 Reps with good tolerance and symmetry 2/25/21  5.  Pt will demonstrate the ability to negotiate 32 6\" stairs with no HHA or difficulty for carryover to walking his three flights of steps at home without difficulty                  Eval Status:  Initiated              Current Status: Progressing, initiated step ups/down from 8\" step with no HHA today, 3x10 reps 2/25/21  6.  Pt will improve FOTO score to 73 in 13 visits to show significant improvement in function for progress to performing normal walking and work activity with little difficulty             Eval Status: 63              Current Status:     PLAN  [x]  Upgrade activities as tolerated     [x]  Continue plan of care  []  Update interventions per flow sheet       []  Discharge due to:_  []  Other:_      Reinier Thomas, PT 2/25/2021  1:32 PM    Future Appointments   Date Time Provider Rajiv Wallis   2/25/2021  2:45 PM Marietta Santos, [place] [facility]   3/1/2021  2:00 PM Brandon Da Silva, [place] [facility]   3/4/2021  2:00 PM Brandon Da Silva [place] [facility]   3/8/2021  2:00 PM Marietta Santos, [place] [facility]   3/11/2021 10:15 AM Latasha Arredondo PA [facility] BS AMB

## 2021-03-01 ENCOUNTER — HOSPITAL ENCOUNTER (OUTPATIENT)
Dept: PHYSICAL THERAPY | Age: 51
Discharge: HOME | End: 2021-03-01
Payer: OTHER GOVERNMENT

## 2021-03-01 PROCEDURE — 97110 THERAPEUTIC EXERCISES: CPT

## 2021-03-01 PROCEDURE — 97530 THERAPEUTIC ACTIVITIES: CPT

## 2021-03-01 NOTE — PROGRESS NOTES
PT DAILY TREATMENT NOTE     Patient Name: Ivon De Oliveira  Date:3/1/2021  : 1970  [x]  Patient  Verified  Payor: Celean Kan / Plan: Carrie Dawson / Product Type: Federal Funded Programs /    In time: 4829  Out time: 7875  Total Treatment Time (min): 43  Visit #: 8 of 10    Treatment Area: Pain in left knee [M25.562]    SUBJECTIVE  Pain Level (0-10 scale): 3/10 + soreness  Any medication changes, allergies to medications, adverse drug reactions, diagnosis change, or new procedure performed?: [x] No    [] Yes (see summary sheet for update)  Subjective functional status/changes:   [] No changes reported  Pt reports increased soreness from prior session in both the back and the knees despite last session being 8 days ago. OBJECTIVE    20 Min Therapeutic Exercise:  [x]? ?? See flow sheet : mobility and strengthening   Rationale: increase ROM and increase strength to improve the patients ability to perform ADLs safely     24 min Therapeutic Activity:  [x]? ??  See flow sheet : sit <> stand, step up/ down, squatting   Rationale: increase strength, improve coordination and improve balance  to improve the patients ability to return to functional movements         With   [x] TE   [] TA   [] neuro   [] other: Patient Education: [x] Review HEP    [] Progressed/Changed HEP based on:   [] positioning   [] body mechanics   [] transfers   [] heat/ice application    [] other:      Other Objective/Functional Measures:   --3x12 step ups on 6\"  --prolonged time on stretching at end of session to reduce post-session soreness  --added ITB stretch     Pain Level (0-10 scale) post treatment: 3/10 \"loosened me up\"    ASSESSMENT/Changes in Function: Pt responds well to session today despite increased muscle soreness continuing to meet goals. Pt reports 25-30% improvement since the start of care. Pt has met most goals at this time, plan to do a progress note at next session.      Patient will continue to benefit from skilled PT services to modify and progress therapeutic interventions, address functional mobility deficits, address ROM deficits, address strength deficits, analyze and address soft tissue restrictions, analyze and cue movement patterns and analyze and modify body mechanics/ergonomics to attain remaining goals.      []  See Plan of Care  []  See progress note/recertification  []  See Discharge Summary         Progress towards goals / Updated goals:  Short Term Goals: To be accomplished in 5 treatments:  1.  Pt will be compliant and independent with HEP in order to facilitate PT sessions and aid with self management             Eval Status:  Initiated             Current Status: MET, pt completes some to all of HEP daily 2/15/21  2.  Pt to tolerate 30 min or more of TE and/or Interventions w/o increased s/s             Eval Status:  Initiated             Current Status: MET, pt reports no increased pain from baseline today with 45 mins 2/11/21      Long Term Goals: To be accomplished in 10 treatments:  1.  Pt will report 50% improvement or better with left knee dysfunction to show a significant increase in ability to tolerate ADL             Eval Status:  Initiated             Current Status: Progressing pt reports 25-30% improvement since start of care 3/1/21  2.  Pt will ambulate 500' with little to no antalgic gait for progress to a normal gait patter with community ambulation              Eval Status:  Ambulates with a left antalgic gait             Current Status: Progressing with a more normalized gait today 2/25/21  3.  Pt will ambulate 1/2 mile on TM with little to no increased left knee s/s to return to walking at [address] during normal weekly shopping               Eval Status:  Limited walking with daily activity             Current Status: MET, pt reports ability to do all normal walking/grocery shopping with no increased pain from baseline 3/1/21  4.  Pt will demonstrate the ability for sit - stand x 20 reps with symmetrical stance, no added pain and little to no difficulty               Eval Status:  Initiated             Current Status: MET at 3x10 Reps with good tolerance and symmetry 2/25/21  5.  Pt will demonstrate the ability to negotiate 32 6\" stairs with no HHA or difficulty for carryover to walking his three flights of steps at home without difficulty                  Eval Status:  Initiated              Current Status: MET step ups/down from 8\" step with no HHA today, 3x12 reps 3/1/21  6.  Pt will improve FOTO score to 73 in 13 visits to show significant improvement in function for progress to performing normal walking and work activity with little difficulty             Eval Status: 63              Current Status:     PLAN  [x]  Upgrade activities as tolerated     [x]  Continue plan of care  []  Update interventions per flow sheet       []  Discharge due to:_  []  Other:_      Tobias Dhaliwal, JYOTI 3/1/2021  2:05 PM    Future Appointments   Date Time Provider Rajiv Wallis   [facility]2021  [facility]   [facility]2021  [facility]   3/11/2021 10:15 AM Petey Aguila PA [facility] BS AMB

## 2021-03-04 ENCOUNTER — HOSPITAL ENCOUNTER (OUTPATIENT)
Dept: PHYSICAL THERAPY | Age: 51
Discharge: HOME | End: 2021-03-04
Payer: OTHER GOVERNMENT

## 2021-03-04 PROCEDURE — 97110 THERAPEUTIC EXERCISES: CPT

## 2021-03-04 PROCEDURE — 97530 THERAPEUTIC ACTIVITIES: CPT

## 2021-03-04 PROCEDURE — 97035 APP MDLTY 1+ULTRASOUND EA 15: CPT

## 2021-03-04 NOTE — PROGRESS NOTES
PT DAILY TREATMENT NOTE     Patient Name: Anne Marino  Date:3/4/2021  : 1970  [x]  Patient  Verified  Payor: Reyna Found / Plan: Pat Sow / Product Type: [address] Programs /    In time: 9083  Out time:   Total Treatment Time (min): 52  Visit #:  10    Treatment Area: Pain in left knee [M25.562]    SUBJECTIVE  Pain Level (0-10 scale): 4/10  Any medication changes, allergies to medications, adverse drug reactions, diagnosis change, or new procedure performed?: [x] No    [] Yes (see summary sheet for update)  Subjective functional status/changes:   [] No changes reported  \"everything hurts today, my knees, my back, feels like I have a balloon in my stomach\"    OBJECTIVE           Modality rationale: decrease inflammation, decrease pain and increase tissue extensibility to improve the patients ability to perform usual activity   Min Type Additional Details     []? Estim:  []? Unatt       []? IFC  []? Premod                        []?Other:  []?w/ice   []?w/heat  Position:  Location:     []? Estim: []? Att    []? TENS instruct  []? NMES                    []?Other:  []?w/US   []?w/ice   []?w/heat  Position:  Location:     []? Traction: []? Cervical       []? Lumbar                       []? Prone          []? Supine                       []?Intermittent   []? Continuous Lbs:  []? before manual  []? after manual   8 [x]? Ultrasound: [x]? Continuous   []? Pulsed                           [x]? 1MHz   []? 3MHz Location: Left Knee  W/cm2: 1.3     []? Iontophoresis with dexamethasone         Location: []? Take home patch   []? In clinic     []? Ice     []?  heat  []? Ice massage  []? Laser   []? Anodyne Position:  Location:     []? Laser with stim  []? Other: Position:  Location:     []? Vasopneumatic Device Pressure:       []? lo []? med []? hi   Temperature: []? lo []? med []? hi   [x]? Skin assessment post-treatment:  [x]? intact []? redness- no adverse reaction []?redness - adverse reaction:     24 Min Therapeutic Exercise:  [x]? ??? See flow sheet : mobility and strengthening   Rationale: increase ROM and increase strength to improve the patients ability to perform ADLs safely    15 min Therapeutic Activity:  [x]? ???  See flow sheet : sit <> stand, step up/ down, squatting   Rationale: increase strength, improve coordination and improve balance  to improve the patients ability to return to functional movements        With   [x] TE   [] TA   [] neuro   [] other: Patient Education: [x] Review HEP    [] Progressed/Changed HEP based on:   [x] positioning   [] body mechanics   [] transfers   [] heat/ice application    [] other:      Other Objective/Functional Measures:   --some swelling present  --MMT knee flex 5/5, ext 4-/5 (pain noted in the medial and lateral patella), hip flex 5-/5  Pain Average: 5/10  Pain at worst: 7/10  Pain at best: 3/10    --sharp pain noted \"across the knee\" both on the medial and lateral sides of the patella     Pain Level (0-10 scale) post treatment: 6/10    ASSESSMENT/Changes in Function: Pt has attended 9 treatment sessions of skilled PT up to this point, making improvements overall in function, strength, and ability to complete ADLs with increased ease. Pt has had consistent pain at 3/10 at baseline continued since [facility], however pt reports his knee always feels \"looser\" after sessions. Pt is still having difficulty with continued pain in the knees and has only subjectively noted minor improvements in overall function since surgery. Although pt reports minimal subjective improvement pt has met 5/8 goals so far.      Perceived Improvements: walking, slightly improved in most functional activities since surgery  Perceived Deficits: sleeping (pt reports he as more pain when going to bed/sleep, \"pressure is on it\") continued random elevations in pain    Patient will continue to benefit from skilled PT services to modify and progress therapeutic interventions, address functional mobility deficits, address ROM deficits, address strength deficits, analyze and address soft tissue restrictions, analyze and cue movement patterns and analyze and modify body mechanics/ergonomics to attain remaining goals.      []  See Plan of Care  []  See progress note/recertification  []  See Discharge Summary         Progress towards goals / Updated goals:  Short Term Goals: To be accomplished in 5 treatments:  1.  Pt will be compliant and independent with HEP in order to facilitate PT sessions and aid with self management             Eval Status:  Initiated             Current Status: MET, pt completes some to all of HEP daily 2/15/21  2.  Pt to tolerate 30 min or more of TE and/or Interventions w/o increased s/s             Eval Status:  Initiated             Current Status: MET, pt reports no increased pain from baseline today with 45 mins 2/11/21      Long Term Goals: To be accomplished in 10 treatments:  1.  Pt will report 50% improvement or better with left knee dysfunction to show a significant increase in ability to tolerate ADL             Eval Status:  Initiated             Current Status: Progressing, pt reports 25-30% improvement 3/4/21  2.  Pt will ambulate 500' with little to no antalgic gait for progress to a normal gait patter with community ambulation              Eval Status:  Ambulates with a left antalgic gait             Current Status: Progressing, pt walks with left antalgic gait for 20% (inconsistently) of 500ft 3/4/21  3.  Pt will ambulate 1/2 mile on TM with little to no increased left knee s/s to return to walking at [facility] during normal weekly shopping               Eval Status:  Limited walking with daily activity             Current Status: MET, pt reports ability to do all normal walking/grocery shopping with no increased pain from baseline 3/1/21  4.  Pt will demonstrate the ability for sit - stand x 20 reps with symmetrical stance, no added pain and little to no difficulty               Eval Status:  Initiated             Current Status: MET at 3x10 Reps with good tolerance and symmetry 2/25/21  5.  Pt will demonstrate the ability to negotiate 32 6\" stairs with no HHA or difficulty for carryover to walking his three flights of steps at home without difficulty                  Eval Status:  Initiated              Current Status: MET step ups/down from 8\" step with no HHA today, 3x12 reps 3/1/21  6.  Pt will improve FOTO score to 73 in 13 visits to show significant improvement in function for progress to performing normal walking and work activity with little difficulty             Eval Status: 63              Current Status: No change, 63 today 3/4/21     PLAN  [x]  Upgrade activities as tolerated     [x]  Continue plan of care  []  Update interventions per flow sheet       []  Discharge due to:_  []  Other:_      Rachelle Brownlee, JYOTI 3/4/2021  9:58 AM    Future Appointments   Date Time Provider Rajiv Wallis   3/4/2021  2:00 PM Carlton Royal, [place] [facility]   3/8/2021  2:00 PM Steff Chan, [place] [facility]   3/11/2021 10:15 AM Debbi Chang PA [facility] AM[facility]

## 2021-03-04 NOTE — PROGRESS NOTES
[facility] at [address] [facility] [facility], [address] [place] [address]  Phone: [phone]      Fax:  [phone]    Progress Note  Patient name: Louisa Valencia Start of Care: 2021   Referral source: Barbara Srivastava : 1970               Medical Diagnosis: Pain in left knee [M25.562]  Payor: [facility] CCN / Plan: Massiel Ruize / Product Type: [employer] Programs /  Onset Date: Sx 21               Treatment Diagnosis: Left knee pain   Prior Hospitalization: see medical history Provider#: [id number]   Medications: Verified on Patient summary List   Comorbidities: DM, OA, Cornia transplant (B) eye,  Prior Level of Function: Long time (B) Knee pain.  Able to stand for work 35 - 40 hrs without difficulty    Visits from Start of Care: 9    Missed Visits: 0    Established Goals:          Excellent Good         Limited           None  [x] Increased ROM   []  [x]  []  []  [x] Increased Strength  []  [x]  []  []  [x] Increased Mobility  []  [x]  []  []   [x] Decreased Pain   []  []  [x]  []  [x] Decreased Swelling  []  []  [x]  []    Current Goals:  Short Term Goals: To be accomplished in 5 treatments:  1.  Pt will be compliant and independent with HEP in order to facilitate PT sessions and aid with self management             Eval Status:  Initiated             Current Status: MET, pt completes some to all of HEP daily   2.  Pt to tolerate 30 min or more of TE and/or Interventions w/o increased s/s             Eval Status:  Initiated             Current Status: MET, pt reports no increased pain from baseline today with 45 mins   Long Term Goals: To be accomplished in 10 treatments:  1.  Pt will report 50% improvement or better with left knee dysfunction to show a significant increase in ability to tolerate ADL             Eval Status:  Initiated             Current Status: Progressing, pt reports 25-30% improvement   2.  Pt will ambulate 500' with little to no antalgic gait for progress to a normal gait patter with community ambulation              Eval Status:  Ambulates with a left antalgic gait             Current Status: Progressing, pt walks with left antalgic gait for 20% (inconsistently) of 500ft   3.  Pt will ambulate 1/2 mile on TM with little to no increased left knee s/s to return to walking at [place] during normal weekly shopping               Eval Status:  Limited walking with daily activity             Current Status: MET, pt reports ability to do all normal walking/grocery shopping with no increased pain from baseline   4.  Pt will demonstrate the ability for sit - stand x 20 reps with symmetrical stance, no added pain and little to no difficulty               Eval Status:  Initiated             Current Status: MET at 3x10 Reps with good tolerance and symmetry   5.  Pt will demonstrate the ability to negotiate 32 6\" stairs with no HHA or difficulty for carryover to walking his three flights of steps at home without difficulty                  Eval Status:  Initiated              Current Status: MET step ups/down from 8\" step with no HHA today, 3x12 reps  6.  Pt will improve FOTO score to 73 in 13 visits to show significant improvement in function for progress to performing normal walking and work activity with little difficulty             Eval Status: 63              Current Status: No change, 63 today     Key Functional Changes:   --some swelling present  --MMT knee flex 5/5, ext 4-/5 (pain noted in the medial and lateral patella), hip flex 5-/5  --sharp pain noted \"across the knee\" both on the medial and lateral sides of the patella  Pain Average: 5/10  Pain at worst: 7/10  Pain at best: 3/10    Updated Goals: to be achieved in 8 visits:  1.   Pt will report 50% improvement or better with left knee dysfunction to show a significant increase in ability to tolerate ADL              Status at last note/certification: Progressing, pt reports 25-30% improvement    Current Status:             2.  Pt will ambulate 500' with little to no antalgic gait for progress to a normal gait patter with community ambulation               Status at last note/certification: Progressing, pt walks with left antalgic gait for 20% (inconsistently) of 500ft   Current Status:             3.  Pt will report less than 1-2 sleep disturbances in a night to achieve restful sleep               Status at last note/certification: Pt reports knee keeping him up because he can't get comfortable    Current Status:             4.  Pt will be able to stand for 45 minutes with pain 3/10 or better to be able to perform functionally in vocational duties              Status at last note/certification: Pt reports pain with prolonged standing >15min   Current Status:             5.  Pt will improve FOTO score to 73 in 13 visits to show significant improvement in function for progress to performing normal walking and work activity with little difficulty              Status at last note/certification: No change, 63 (at IE and PN)   Current Status:                 ASSESSMENT/RECOMMENDATIONS: Pt has attended 9 treatment sessions of skilled PT up to this point, making improvements overall in function, strength, and ability to complete ADLs with increased ease. Pt has had consistent pain at 3/10 at baseline continued since [facility], however pt reports his knee always feels \"looser\" after sessions. Pt is still having difficulty with continued pain in the knees and has only subjectively noted minor improvements in overall function since surgery.  Although pt reports minimal subjective improvement pt has met 5/8 goals so far.      Perceived Improvements: walking, slightly improved in most functional activities since surgery  Perceived Deficits: sleeping (pt reports he as more pain when going to bed/sleep, \"pressure is on it\") continued random elevations in pain     Patient will continue to benefit from skilled PT services to modify and progress therapeutic interventions, address functional mobility deficits, address ROM deficits, address strength deficits, analyze and address soft tissue restrictions, analyze and cue movement patterns and analyze and modify body mechanics/ergonomics to attain remaining goals. [x]Continue therapy per initial plan/protocol at a frequency of  2 x per week for 8 sessions  []Continue therapy with the following recommended changes:_____________________      _____________________________________________________________________  []Discontinue therapy progressing towards or have reached established goals  []Discontinue therapy due to lack of appreciable progress towards goals  []Discontinue therapy due to lack of attendance or compliance  []Await Physician's recommendations/decisions regarding therapy  []Other:________________________________________________________________    Thank you for this referral.   eFrnando Pena, PTA 3/4/2021 10:11 AM  NOTE TO PHYSICIAN:  PLEASE COMPLETE THE ORDERS BELOW AND   FAX TO [facility]: [phone]  If you are unable to process this request in 24 hours please contact our office:   [phone]  []  I have read the above report and request that my patient continue as recommended. []  I have read the above report and request that my patient continue therapy with the following changes/special instructions:________________________________________  []I have read the above report and request that my patient be discharged from therapy.     [de-identified] Signature:____________Date:_________TIME:________     LOTTIE Delcid  ** Signature, Date and Time must be completed for valid certification **

## 2021-03-08 ENCOUNTER — HOSPITAL ENCOUNTER (OUTPATIENT)
Dept: PHYSICAL THERAPY | Age: 51
Discharge: HOME | End: 2021-03-08
Payer: OTHER GOVERNMENT

## 2021-03-08 PROCEDURE — 97110 THERAPEUTIC EXERCISES: CPT

## 2021-03-08 PROCEDURE — 97530 THERAPEUTIC ACTIVITIES: CPT

## 2021-03-08 NOTE — PROGRESS NOTES
PT DAILY TREATMENT NOTE     Patient Name: Santa Dickerson  Date:3/8/2021  : 1970  [x]  Patient  Verified  Payor: [facility] CCN / [facility] / Product Type: Federal Funded Programs /    In time:2:03  Out time:2:47  Total Treatment Time (min): 44  Visit #: 1 of 8    Medicare/BCBS Only   Total Timed Codes (min):    1:1 Treatment Time:          Treatment Area: Pain in left knee [M25.562]    SUBJECTIVE  Pain Level (0-10 scale): 3  Any medication changes, allergies to medications, adverse drug reactions, diagnosis change, or new procedure performed?: [x] No    [] Yes (see summary sheet for update)  Subjective functional status/changes:   [] No changes reported  Doing alright. Walking and standing up more.     OBJECTIVE        20 min Therapeutic Exercise:  [x] See flow sheet :   Rationale: increase ROM, increase strength and improve coordination to improve the patients ability to tolerate usual activity    24 min Therapeutic Activity:  [x]  See flow sheet :   Rationale: increase strength, improve coordination, improve balance and increase proprioception  to improve the patients ability to return to normal           With   [x] TE   [x] TA   [] neuro   [] other: Patient Education: [x] Review HEP    [] Progressed/Changed HEP based on:   [] positioning   [] body mechanics   [] transfers   [] heat/ice application    [] other:      Other Objective/Functional Measures:   - Initiate BOSU Squats and Unilat Mini squats  - Initiate TM     Pain Level (0-10 scale) post treatment: 3    ASSESSMENT/Changes in Function:   - Normal gait pattern with ambulation  - Good tolerance with TM at 2.0mph for 1/4 mile with no added pain  - Good response to added exercises    Patient will continue to benefit from skilled PT services to modify and progress therapeutic interventions, address functional mobility deficits, address ROM deficits, address strength deficits, analyze and address soft tissue restrictions and analyze and cue movement patterns to attain remaining goals. []  See Plan of Care  []  See progress note/recertification  []  See Discharge Summary         Updated Goals: to be achieved in 8 visits:  1. Pt will report 50% improvement or better with left knee dysfunction to show a significant increase in ability to tolerate ADL                         Status at last note/certification: Progressing, pt reports 25-30% improvement               Current Status:             2. Pt will ambulate 500' with little to no antalgic gait for progress to a normal gait patter with community ambulation                          Status at last note/certification: Progressing, pt walks with left antalgic gait for 20% (inconsistently) of 500ft              Current Status:             3. Pt will report less than 1-2 sleep disturbances in a night to achieve restful sleep                          Status at last note/certification: Pt reports knee keeping him up because he can't get comfortable               Current Status:             4. Pt will be able to stand for 45 minutes with pain 3/10 or better to be able to perform functionally in vocational duties                         Status at last note/certification: Pt reports pain with prolonged standing >15min              Current Status: Progressing with 28' or more of standing/walking with treatment 3/8/21            5.  Pt will ambulate 1/2 mile on TM with little to no increased left knee s/s to return to walking at [address] [facility] during normal weekly shopping                Status at last note/certification: Pt reports ability to do all normal walking/grocery shopping with no increased pain from baseline    Current Status: Pt amb 1/4 mile on TM 22. 0mph in 7' 40\" 3/8/21    6.   Pt will improve FOTO score to 73 in 13 visits to show significant improvement in function for progress to performing normal walking and work activity with little difficulty               Status at last note/certification: No change, 63 (at IE and PN)              Current Status:                  PLAN  [x]  Upgrade activities as tolerated     [x]  Continue plan of care  []  Update interventions per flow sheet       []  Discharge due to:_  []  Other:_      Manuel Medley, PT 3/8/2021  2:10 PM    Future Appointments   Date Time Provider Department Center   3/11/2021 10:15 AM Brittanie Augustin PA [facility]

## 2021-03-10 NOTE — PROGRESS NOTES
Patient: Misael Mustafa  YOB: 1970       HISTORY:  The patient presents for reevaluation of his left knee status post arthroscopic partial medial menisectomy with grade IV chondromalaica on 1/14/21. Patient is improved, states pain is a 2 out of 10.  he has gone to physical therapy. Just notes occasional soreness. Patient denies any fever, chills, chest pain, shortness of breath or calf pain. The remainder of the review of systems is negative. There are no new illness or injuries to report since last seen in the office. No changes in medications, allergies, social or family history. PHYSICAL EXAMINATION:    Visit Vitals  BP (!) 141/78   Pulse 82   Temp 97.3 °F (36.3 °C)   Ht 5' 9\" (1.753 m)   Wt 312 lb 3.2 oz (141.6 kg)   BMI 46.10 kg/m²     The patient is a well-developed, well-nourished male in no acute distress. The patient is alert and oriented times three. The patient appears to be well groomed. Mood and affect are normal.   ORTHOPEDIC EXAM of Left knee: Inspection: Effusion not present,  incisions well healed  TTP: medial joint line  Range of motion: 0-120 flexion  Stability: Stable  Strength: 5/5  2+ distal pulses    IMPRESSION:  Status post Left knee arthroscopic partial medial menisectomy with degenerative arthritis with joint space narrowing    PLAN:  1. Patient cont to improve post operatively  2. Will cont with PT gradually increasing his activities  3.  Will return to work on 3/15    RTC PRN    ASHA Rodriguez [phone]

## 2021-03-11 ENCOUNTER — OFFICE VISIT (OUTPATIENT)
Dept: ORTHOPEDIC SURGERY | Age: 51
End: 2021-03-11
Payer: OTHER GOVERNMENT

## 2021-03-11 VITALS
HEIGHT: 69 IN | SYSTOLIC BLOOD PRESSURE: 141 MMHG | TEMPERATURE: 97.3 F | DIASTOLIC BLOOD PRESSURE: 78 MMHG | WEIGHT: 312.2 LBS | BODY MASS INDEX: 46.24 KG/M2 | HEART RATE: 82 BPM

## 2021-03-11 DIAGNOSIS — S83.242D TEAR OF MEDIAL MENISCUS OF LEFT KNEE, CURRENT, UNSPECIFIED TEAR TYPE, SUBSEQUENT ENCOUNTER: Primary | ICD-10-CM

## 2021-03-11 DIAGNOSIS — M17.12 PRIMARY OSTEOARTHRITIS OF LEFT KNEE: ICD-10-CM

## 2021-03-11 PROCEDURE — 99024 POSTOP FOLLOW-UP VISIT: CPT | Performed by: PHYSICIAN ASSISTANT

## 2021-03-11 NOTE — LETTER
NOTIFICATION RETURN TO WORK / SCHOOL 
 
3/11/2021 10:42 AM 
 
Mr. Ricardo Isaac [address] [address] To Whom It May Concern: 
 
Ricardo Isaac is currently under the care of [address] [place], [place] [address]mary jo [place]. He will return to work at full duty starting Monday, March 15. If there are questions or concerns please have the patient contact our office. Sincerely, LOTTIE Wahl

## 2021-03-15 ENCOUNTER — HOSPITAL ENCOUNTER (OUTPATIENT)
Dept: PHYSICAL THERAPY | Age: 51
Discharge: HOME | End: 2021-03-15
Payer: OTHER GOVERNMENT

## 2021-03-15 PROCEDURE — 97530 THERAPEUTIC ACTIVITIES: CPT

## 2021-03-15 PROCEDURE — 97110 THERAPEUTIC EXERCISES: CPT

## 2021-03-15 NOTE — PROGRESS NOTES
PT DAILY TREATMENT NOTE     Patient Name: Tacos Scott  Date:3/15/2021  : 1970  [x]  Patient  Verified  Payor: Brenda Howard / Plan: Mylene Sanchez / Product Type: Federal Funded Programs /    In time: 3778  Out time: 5109  Total Treatment Time (min): 41  Visit #: 2 of 8    Treatment Area: Pain in left knee [M25.562]    SUBJECTIVE  Pain Level (0-10 scale): 3/10  Any medication changes, allergies to medications, adverse drug reactions, diagnosis change, or new procedure performed?: [x] No    [] Yes (see summary sheet for update)  Subjective functional status/changes:   [] No changes reported  Pt reports he goes back to work today. OBJECTIVE     24 Min Therapeutic Exercise:  [x]? ???? See flow sheet : mobility and strengthening   Rationale: increase ROM and increase strength to improve the patients ability to perform ADLs safely     17 min Therapeutic Activity:  [x]? ????  See flow sheet : sit <> stand, step up/ down, squatting   Rationale: increase strength, improve coordination and improve balance  to improve the patients ability to return to functional movements          With   [x] TE   [] TA   [] neuro   [] other: Patient Education: [x] Review HEP    [] Progressed/Changed HEP based on:   [x] positioning   [] body mechanics   [] transfers   [] heat/ice application    [] other:      Other Objective/Functional Measures:   --pain with 12\" step up, reduced to 8\"  --good tolerance for updated program     Pain Level (0-10 scale) post treatment: 3.510    ASSESSMENT/Changes in Function: Pt responds well to session despite increased pain with step up to large box. Pt had no issues with smaller box size. Instructed to continue quad stretch PRN for soreness at home. Pt returns to work today.      Patient will continue to benefit from skilled PT services to modify and progress therapeutic interventions, address functional mobility deficits, address ROM deficits, address strength deficits, analyze and address soft tissue restrictions, analyze and cue movement patterns and analyze and modify body mechanics/ergonomics to attain remaining goals. []  See Plan of Care  []  See progress note/recertification  []  See Discharge Summary         Progress towards goals / Updated goals:  1.  Pt will report 50% improvement or better with left knee dysfunction to show a significant increase in ability to tolerate ADL              Status at last note/certification: Progressing, pt reports 25-30% improvement               Current Status:             2.  Pt will ambulate 500' with little to no antalgic gait for progress to a normal gait patter with community ambulation               Status at last note/certification: Progressing, pt walks with left antalgic gait for 20% (inconsistently) of 500ft              Current Status: Progressing, inconsistent antalgic gait 3/15/21         3.  Pt will report less than 1-2 sleep disturbances in a night to achieve restful sleep               Status at last note/certification: Pt reports knee keeping him up because he can't get comfortable               Current Status:             4.  Pt will be able to stand for 45 minutes with pain 3/10 or better to be able to perform functionally in vocational duties              Status at last note/certification: Pt reports pain with prolonged standing >15min              Current Status: Progressing with 28' or more of standing/walking with treatment 3/8/21            5.  Pt will ambulate 1/2 mile on TM with little to no increased left knee s/s to return to walking at [address] [facility] during normal weekly shopping                Status at last note/certification: Pt reports ability to do all normal walking/grocery shopping with no increased pain from baseline               Current Status: Pt amb 1/4 mile on TM 22. 0mph in 7' 40\" 3/8/21    6.   Pt will improve FOTO score to 73 in 13 visits to show significant improvement in function for progress to performing normal walking and work activity with little difficulty              Status at last note/certification: No change, 63 (at IE and PN)              Current Status:          PLAN  [x]  Upgrade activities as tolerated     [x]  Continue plan of care  []  Update interventions per flow sheet       []  Discharge due to:_  []  Other:_      Freddy Bhatia PTA 3/15/2021  12:41 PM    Future Appointments   Date Time Provider Rajiv Wallis   3/18/2021 12:30 PM Sumaya Maddox [facility]   3/22/2021 12:30 PM Deepthi Croft [facility]   3/25/2021 12:30 PM Mandi Rivas, PT [facility]   3/29/2021 12:30 PM Ivett Doran, JYOTI [facility]

## 2021-03-18 ENCOUNTER — APPOINTMENT (OUTPATIENT)
Dept: PHYSICAL THERAPY | Age: 51
End: 2021-03-18
Payer: OTHER GOVERNMENT

## 2021-03-22 ENCOUNTER — HOSPITAL ENCOUNTER (OUTPATIENT)
Dept: PHYSICAL THERAPY | Age: 51
Discharge: HOME | End: 2021-03-22
Payer: OTHER GOVERNMENT

## 2021-03-22 PROCEDURE — 97530 THERAPEUTIC ACTIVITIES: CPT

## 2021-03-22 PROCEDURE — 97110 THERAPEUTIC EXERCISES: CPT

## 2021-03-22 NOTE — PROGRESS NOTES
PT DAILY TREATMENT NOTE     Patient Name: Neelam Scott  Date:3/22/2021  : 1970  [x]  Patient  Verified  Payor: Re Becerra / Plan: Fco Parra / Product Type: Federal Funded Programs /    In time: 7091  Out time: 5237  Total Treatment Time (min): 55  Visit #: 3 of 8    Treatment Area: Pain in left knee [M25.562]    SUBJECTIVE  Pain Level (0-10 scale): 3.5/10  Any medication changes, allergies to medications, adverse drug reactions, diagnosis change, or new procedure performed?: [x] No    [] Yes (see summary sheet for update)  Subjective functional status/changes:   [x] No changes reported  Pt reports work has been going well, the knee hasn't been giving him much trouble at work. OBJECTIVE    23 Min Therapeutic Exercise:  [x]?????? See flow sheet : mobility and strengthening   Rationale: increase ROM and increase strength to improve the patients ability to perform ADLs safely     23 min Therapeutic Activity:  [x]??????  See flow sheet : sit <> stand, step up/ down, squatting   Rationale: increase strength, improve coordination and improve balance  to improve the patients ability to return to functional movements          With   [x] TE   [x] TA   [] neuro   [] other: Patient Education: [x] Review HEP    [] Progressed/Changed HEP based on:   [x] positioning   [x] body mechanics   [] transfers   [] heat/ice application    [] other:      Other Objective/Functional Measures:  --initiate box lift/carry next visit   --50-60% perceived improvement   --pt notes stretches always help    Pain Level (0-10 scale) post treatment: 3/10    ASSESSMENT/Changes in Function: Pt displays improved mobility and ability to perform functional activities at today's session.  Goals checked, pt has progressed well towards goals, meeting several.     Patient will continue to benefit from skilled PT services to modify and progress therapeutic interventions, address functional mobility deficits, address ROM deficits, address strength deficits, analyze and address soft tissue restrictions, analyze and cue movement patterns and analyze and modify body mechanics/ergonomics to attain remaining goals.      []  See Plan of Care  []  See progress note/recertification  []  See Discharge Summary         Progress towards goals / Updated goals:  1.  Pt will report 50% improvement or better with left knee dysfunction to show a significant increase in ability to tolerate ADL              Status at last note/certification: Progressing, pt reports 25-30% improvement               Current Status: MET pt reports 50-60% improvement 3/22/21          2.  Pt will ambulate 500' with little to no antalgic gait for progress to a normal gait patter with community ambulation               Status at last note/certification: Progressing, pt walks with left antalgic gait for 20% (inconsistently) of 500ft              Current Status: MET pt is able to amb with improved gait pattern today 3/22/21        3.  Pt will report less than 1-2 sleep disturbances in a night to achieve restful sleep               Status at last note/certification: Pt reports knee keeping him up because he can't get comfortable               IWQGBXD Status: MET pt reports he uses a pillow between the knees at sleeps well with no disturbances 3/22/21         4.  Pt will be able to stand for 45 minutes with pain 3/10 or better to be able to perform functionally in vocational duties              Status at last note/certification: Pt reports pain with prolonged standing >15min              Current Status: MET pt reports he has been able to stand at work for 60 mins at a time without pain 3/22/21  5.  Pt will ambulate 1/2 mile on TM with little to no increased left knee s/s to return to walking at [facility] during normal weekly shopping                Status at last note/certification: Pt reports ability to do all normal walking/grocery shopping with no increased pain from baseline               EYHVPLH Status: Pt amb 1/4 mile on TM 22. 0mph in 7' 40\" 3/8/21    6.  Pt will improve FOTO score to 73 in 13 visits to show significant improvement in function for progress to performing normal walking and work activity with little difficulty              Status at last note/certification: No change, 63 (at IE and PN)              Current Status:      PLAN  [x]  Upgrade activities as tolerated     [x]  Continue plan of care  []  Update interventions per flow sheet       []  Discharge due to:_  []  Other:_      Satinder Dueñas PTA 3/22/2021  12:54 PM    Future Appointments   Date Time Provider Rajiv Wallis   3/25/2021 12:30 PM Altaf Reece [facility]   3/29/2021 12:30 PM Nevin La PTA [facility]

## 2021-03-25 ENCOUNTER — APPOINTMENT (OUTPATIENT)
Dept: PHYSICAL THERAPY | Age: 51
End: 2021-03-25
Payer: OTHER GOVERNMENT

## 2021-03-29 ENCOUNTER — HOSPITAL ENCOUNTER (OUTPATIENT)
Dept: PHYSICAL THERAPY | Age: 51
Discharge: HOME | End: 2021-03-29
Payer: OTHER GOVERNMENT

## 2021-03-29 PROCEDURE — 97110 THERAPEUTIC EXERCISES: CPT

## 2021-03-29 PROCEDURE — 97530 THERAPEUTIC ACTIVITIES: CPT

## 2021-03-29 NOTE — PROGRESS NOTES
PT DAILY TREATMENT NOTE     Patient Name: Radha Scott  Date:3/29/2021  : 1970  [x]  Patient  Verified  Payor: [facility] / Plan: [facility] / Product Type: Federal Funded Programs /    In time: 6307  Out time: 1320  Total Treatment Time (min): 42  Visit #: 4 of 8    Treatment Area: Pain in left knee [M25.562]    SUBJECTIVE  Pain Level (0-10 scale): 3/10  Any medication changes, allergies to medications, adverse drug reactions, diagnosis change, or new procedure performed?: [x] No    [] Yes (see summary sheet for update)  Subjective functional status/changes:   [] No changes reported  Work has been going pretty well, back sometimes bothers him more than knees    OBJECTIVE    25 Min Therapeutic Exercise:  [x]??????? See flow sheet : mobility and strengthening   Rationale: increase ROM and increase strength to improve the patients ability to perform ADLs safely     17 min Therapeutic Activity:  [x]???????  See flow sheet : sit <> stand, step up/ down, squatting   Rationale: increase strength, improve coordination and improve balance  to improve the patients ability to return to functional movements          With   [x] TE   [x] TA   [] neuro   [] other: Patient Education: [x] Review HEP    [] Progressed/Changed HEP based on:   [x] positioning   [] body mechanics   [] transfers   [] heat/ice application    [] other:      Other Objective/Functional Measures:   --difficulty with SL mini squat  --initiated box lift and carry, #10 5X 70ft   --continued TM walking 1/4 mi in 7'01\" at 2.2 speed    Pain Level (0-10 scale) post treatment: 3/10    ASSESSMENT/Changes in Function: Pt responds well to session, able to tolerate new and repeated activities with good tolerance, noting minor back pain with box carry (not lift).  Pt educated on tightening core to assist.     Patient will continue to benefit from skilled PT services to modify and progress therapeutic interventions, address functional mobility deficits, address ROM deficits, address strength deficits, analyze and address soft tissue restrictions, analyze and cue movement patterns and analyze and modify body mechanics/ergonomics to attain remaining goals.      []  See Plan of Care  []  See progress note/recertification  []  See Discharge Summary         Progress towards goals / Updated goals:  1.  Pt will report 50% improvement or better with left knee dysfunction to show a significant increase in ability to tolerate ADL              Status at last note/certification: Progressing, pt reports 25-30% improvement               Current Status: MET pt reports 50-60% improvement 3/22/21          2.  Pt will ambulate 500' with little to no antalgic gait for progress to a normal gait patter with community ambulation               Status at last note/certification: Progressing, pt walks with left antalgic gait for 20% (inconsistently) of 500ft              Current Status: MET pt is able to amb with improved gait pattern today 3/22/21        3.  Pt will report less than 1-2 sleep disturbances in a night to achieve restful sleep               Status at last note/certification: Pt reports knee keeping him up because he can't get comfortable               QACVVEE Status: MET pt reports he uses a pillow between the knees at sleeps well with no disturbances 3/22/21         4.  Pt will be able to stand for 45 minutes with pain 3/10 or better to be able to perform functionally in vocational duties              Status at last note/certification: Pt reports pain with prolonged standing >15min              Current Status: MET pt reports he has been able to stand at work for 60 mins at a time without pain 3/22/21  5.  Pt will ambulate 1/2 mile on TM with little to no increased left knee s/s to return to walking at [facility] during normal weekly shopping                Status at last note/certification: Pt reports ability to do all normal walking/grocery shopping with no increased pain from baseline               Current Status: Pt amb 1/4 mile on TM 2.2 mph in 7'01\" 3/29/21   6.  Pt will improve FOTO score to 73 in 13 visits to show significant improvement in function for progress to performing normal walking and work activity with little difficulty              Status at last note/certification: No change, 63 (at IE and PN)              Current Status:      PLAN  [x]  Upgrade activities as tolerated     [x]  Continue plan of care  []  Update interventions per flow sheet       []  Discharge due to:_  []  Other:_      Nimesh Clark PTA 3/29/2021  12:42 PM    Future Appointments   Date Time Provider Rajiv Wallis   4/1/2021 11:45 AM Titi Ayoub PTA [place] [facility]

## 2021-04-01 ENCOUNTER — HOSPITAL ENCOUNTER (OUTPATIENT)
Dept: PHYSICAL THERAPY | Age: 51
Discharge: HOME | End: 2021-04-01
Payer: OTHER GOVERNMENT

## 2021-04-01 PROCEDURE — 97530 THERAPEUTIC ACTIVITIES: CPT

## 2021-04-01 PROCEDURE — 97110 THERAPEUTIC EXERCISES: CPT

## 2021-04-01 NOTE — PROGRESS NOTES
PT DAILY TREATMENT NOTE     Patient Name: Glendy Hudson  Date:2021  : 1970  [x]  Patient  Verified  Payor: [facility] / Plan: [id number] / Product Type: Federal Funded Programs /    In time: 357  Out time: 63  Total Treatment Time (min): 50  Visit #: 5 of 8    Treatment Area: Pain in left knee [M25.562]    SUBJECTIVE  Pain Level (0-10 scale): 3/10  Any medication changes, allergies to medications, adverse drug reactions, diagnosis change, or new procedure performed?: [x] No    [] Yes (see summary sheet for update)  Subjective functional status/changes:   [x] No changes reported    OBJECTIVE    25 Min Therapeutic Exercise:  [x]???????? See flow sheet : mobility and strengthening   Rationale: increase ROM and increase strength to improve the patients ability to perform ADLs safely     25 min Therapeutic Activity:  [x]????????  See flow sheet : sit <> stand, step up/ down, squatting   Rationale: increase strength, improve coordination and improve balance  to improve the patients ability to return to functional movements          With   [x] TE   [x] TA   [] neuro   [] other: Patient Education: [x] Review HEP    [] Progressed/Changed HEP based on:   [x] positioning   [] body mechanics   [] transfers   [] heat/ice application    [] other:      Other Objective/Functional Measures:   --TM walking 1/4 mi in 6'35\" at 2.3-2.5  --monster squats with box (#10 first set, #15 next set)  --box carry with #15 for 8 laps    Pain Level (0-10 scale) post treatment: 3/10    ASSESSMENT/Changes in Function: Pt continues to make improvements in overall function but maintains pain at baseline 3/10.      Patient will continue to benefit from skilled PT services to modify and progress therapeutic interventions, address functional mobility deficits, address ROM deficits, address strength deficits, analyze and address soft tissue restrictions, analyze and cue movement patterns, analyze and modify body mechanics/ergonomics and assess and modify postural abnormalities to attain remaining goals.      []  See Plan of Care  []  See progress note/recertification  []  See Discharge Summary         Progress towards goals / Updated goals:  1.  Pt will report 50% improvement or better with left knee dysfunction to show a significant increase in ability to tolerate ADL              Status at last note/certification: Progressing, pt reports 25-30% improvement               Current Status: MET pt reports 50-60% improvement 3/22/21          2.  Pt will ambulate 500' with little to no antalgic gait for progress to a normal gait patter with community ambulation               Status at last note/certification: Progressing, pt walks with left antalgic gait for 20% (inconsistently) of 500ft              Current Status: MET pt is able to amb with improved gait pattern today 3/22/21        3.  Pt will report less than 1-2 sleep disturbances in a night to achieve restful sleep               Status at last note/certification: Pt reports knee keeping him up because he can't get comfortable               PWZPYXN Status: MET pt reports he uses a pillow between the knees at sleeps well with no disturbances 3/22/21         4.  Pt will be able to stand for 45 minutes with pain 3/10 or better to be able to perform functionally in vocational duties              Status at last note/certification: Pt reports pain with prolonged standing >15min              Current Status: MET pt reports he has been able to stand at work for 60 mins at a time without pain 3/22/21  5.  Pt will ambulate 1/2 mile on TM with little to no increased left knee s/s to return to walking at [facility] during normal weekly shopping                Status at last note/certification: Pt reports ability to do all normal walking/grocery shopping with no increased pain from baseline               Current Status: Progressing pt amb 1/4 mile on TM 2.2-2.5 in 6'35\" with good gait pattern and tolerance 4/1/21   6.  Pt will improve FOTO score to 73 in 13 visits to show significant improvement in function for progress to performing normal walking and work activity with little difficulty              Status at last note/certification: No change, 63 (at IE and PN)              Current Status:         PLAN  [x]  Upgrade activities as tolerated     [x]  Continue plan of care  []  Update interventions per flow sheet       []  Discharge due to:_  []  Other:_      Jose Jain PTA 4/1/2021  11:50 AM    No future appointments.

## 2021-04-08 ENCOUNTER — HOSPITAL ENCOUNTER (OUTPATIENT)
Dept: PHYSICAL THERAPY | Age: 51
Discharge: HOME | End: 2021-04-08
Payer: OTHER GOVERNMENT

## 2021-04-08 PROCEDURE — 97530 THERAPEUTIC ACTIVITIES: CPT

## 2021-04-08 PROCEDURE — 97110 THERAPEUTIC EXERCISES: CPT

## 2021-04-08 NOTE — PROGRESS NOTES
PT DAILY TREATMENT NOTE     Patient Name: Jori Atkins  Date:2021  : 1970  [x]  Patient  Verified  Payor: [facility] CCN / Plan: [facility] CCN / Product Type: Federal Funded Programs /    In time: 3542 Out time: 486  Total Treatment Time (min): 55  Visit #: 6 of 8    Treatment Area: Pain in left knee [M25.562]    SUBJECTIVE  Pain Level (0-10 scale): 3.5  Any medication changes, allergies to medications, adverse drug reactions, diagnosis change, or new procedure performed?: [x] No    [] Yes (see summary sheet for update)  Subjective functional status/changes:   [x] No changes reported    OBJECTIVE    23 Min Therapeutic Exercise:  [x]????????? See flow sheet : mobility and strengthening   Rationale: increase ROM and increase strength to improve the patients ability to perform ADLs safely     23 min Therapeutic Activity:  [x]?????????  See flow sheet : sit <> stand, step up/ down, squatting   Rationale: increase strength, improve coordination and improve balance  to improve the patients ability to return to functional movements          With   [x] TE   [] TA   [] neuro   [] other: Patient Education: [x] Review HEP    [] Progressed/Changed HEP based on:   [x] positioning   [] body mechanics   [] transfers   [] heat/ice application    [] other:      Other Objective/Functional Measures:   Perceived Improvements: walking, going up and down stairs   Perceived Deficits: remaining pain, \"bad\" days, standing for long periods such as at work    Pain at best: 2/10  Pain at worst: 8/10  Pain on average: 3/10    Rated improvement since SOC: 80%    MMT: left knee flex 5-/5, ext 4-/5 *painful with ext*     Pain Level (0-10 scale) post treatment: 3    ASSESSMENT/Changes in Function: Pt has attended 15 sessions of skilled PT to this point, reporting a 80% improvement since [facility].  Pt has met 4/6 goals however is still having consistent pain (8/10 at worst, 3/10 on average) and still feels limited with standing for long periods while working. Pt states that back pain contributes as well to shortened work tolerance however notes that he has had PT for it in the past and it increased pain. Pt has made consistent progressions in overall strength and tolerance for activity as well at consistently attended appointments, being motivated to continue improving to achieve PLOF. Patient will continue to benefit from skilled PT services to modify and progress therapeutic interventions, address functional mobility deficits, address ROM deficits, address strength deficits, analyze and address soft tissue restrictions, analyze and cue movement patterns and analyze and modify body mechanics/ergonomics to attain remaining goals.      []  See Plan of Care  []  See progress note/recertification  []  See Discharge Summary         Progress towards goals / Updated goals:  1.  Pt will report 50% improvement or better with left knee dysfunction to show a significant increase in ability to tolerate ADL              Status at last note/certification: Progressing, pt reports 25-30% improvement               Current Status: MET pt reports 50-60% improvement 3/22/21          2.  Pt will ambulate 500' with little to no antalgic gait for progress to a normal gait patter with community ambulation               Status at last note/certification: Progressing, pt walks with left antalgic gait for 20% (inconsistently) of 500ft              Current Status: MET pt is able to amb with improved gait pattern today 3/22/21        3.  Pt will report less than 1-2 sleep disturbances in a night to achieve restful sleep               Status at last note/certification: Pt reports knee keeping him up because he can't get comfortable               BQOWPIG Status: MET pt reports he uses a pillow between the knees at sleeps well with no disturbances 3/22/21         4.  Pt will be able to stand for 45 minutes with pain 3/10 or better to be able to perform functionally in vocational duties              IQTYXZ at last note/certification: Pt reports pain with prolonged standing >15min              Current Status: MET pt reports he has been able to stand at work for 60 mins at a time without pain 3/22/21  5.  Pt will ambulate 1/2 mile on TM with little to no increased left knee s/s to return to walking at [facility] during normal weekly shopping                Status at last note/certification: Pt reports ability to do all normal walking/grocery shopping with no increased pain from baseline               Current Status: Progressing pt amb 1/4 mile on TM 2.2-2.5 in 6'35\" with good gait pattern and tolerance 4/1/21   6.  Pt will improve FOTO score to 73 in 13 visits to show significant improvement in function for progress to performing normal walking and work activity with little difficulty              Status at last note/certification: No change, 63 (at IE and PN)              Current Status:  Regressed to 62, when asked pt reports he feels like some days are worse for the knee, but feels like it's not because of PT, he feels that PT has really helped 4/8/21    PLAN  [x]  Upgrade activities as tolerated     [x]  Continue plan of care  []  Update interventions per flow sheet       []  Discharge due to:_  []  Other:_      Levora Lipoma, PTA 4/8/2021  12:43 PM    Future Appointments   Date Time Provider Rajiv Wallis   4/13/2021 10:15 AM Kirti Davis, PT [place] [facility]

## 2021-04-08 NOTE — PROGRESS NOTES
[facility] at [address] [facility], [address] [place] [address]  Phone: [phone]      Fax:  [phone]    Progress Note  Patient name: Shyam Scott Start of Care: 2021   Referral source: Kadi Augustin PA : [id number]               Medical Diagnosis: Pain in left knee [M25.562]  Payor: [facility] CCN / Plan: [facility] CCN / Product Type: [employer] /  Onset Date: Sx 21               Treatment Diagnosis: Left knee pain   Prior Hospitalization: see medical history Provider#: [id number]   Medications: Verified on Patient summary List   Comorbidities: DM, OA, Cornia transplant (B) eye,  Prior Level of Function: Long time (B) Knee pain.  Able to stand for work 28 - 40 hrs without difficulty    Visits from Start of Care: 15    Missed Visits: 1    Established Goals:          Excellent Good         Limited           None  [x] Increased ROM   []  [x]  []  []  [x] Increased Strength  []  [x]  []  []  [x] Increased Mobility  []  [x]  []  []   [x] Decreased Pain   []  []  [x]  []  [x] Decreased Swelling  []  []  [x]  []    Key Functional Changes:   Perceived Improvements: walking, going up and down stairs   Perceived Deficits: remaining pain, \"bad\" days, standing for long periods such as at work     Pain at best: 2/10  Pain at worst: 8/10  Pain on average: 3/10     Rated improvement since SOC: 80%     MMT: left knee flex 5-/5, ext 4-/5 *painful with ext*   Extension strength measurement limited by pain with pressure application however pt demonstrates full strength with exercises    Current Goals:  1.  Pt will report 50% improvement or better with left knee dysfunction to show a significant increase in ability to tolerate ADL              Status at last note/certification: Progressing, pt reports 25-30% improvement               Current Status: MET pt reports 50-60% improvement           2.  Pt will ambulate 500' with little to no antalgic gait for progress to a normal gait patter with community ambulation               MNBMGF at last note/certification: Progressing, pt walks with left antalgic gait for 20% (inconsistently) of 500ft              Current Status: MET pt is able to amb with improved gait pattern today         3.  Pt will report less than 1-2 sleep disturbances in a night to achieve restful sleep               Status at last note/certification: Pt reports knee keeping him up because he can't get comfortable               Current Status: MET pt reports he uses a pillow between the knees at sleeps well with no disturbances          4.  Pt will be able to stand for 45 minutes with pain 3/10 or better to be able to perform functionally in vocational duties              Status at last note/certification: Pt reports pain with prolonged standing >15min              Current Status: MET pt reports he has been able to stand at work for 60 mins at a time without pain   5.  Pt will ambulate 1/2 mile on TM with little to no increased left knee s/s to [facility] normal weekly shopping                Status at last note/certification: Pt reports ability to do all normal walking/grocery shopping with no increased pain from baseline               Current Status: Progressing pt amb 1/4 mile on TM 2.2-2.5 in 6'35\" with good gait pattern and tolerance   6.  Pt will improve FOTO score to 73 in 13 visits to show significant improvement in function for progress to performing normal walking and work activity with little difficulty              Status at last note/certification: No change, 63 (at IE and PN)              Current Status:  Regressed to 62, when asked pt reports he feels like some days are worse for the knee, but feels like it's not because of PT, he feels that PT has really helped     Updated Goals: to be achieved in 8 visits:  1.  Pt will be able to stand for 65 min without pain or discomfort to improve tolerance for work activities    Status at last note/certification: previously reported no trouble with 60 mins, however has had some \"bad days\" impacting ability to stand for long periods   Current Status:   2. Pt will demonstrate knee flex/ext MMT 4+/5 without pain or discomfort to achieve safe ability to perform functional movements such as squatting and stairs   Status at last note/certification: left knee flex 5-/5, ext 4-/5 *painful with ext*    Current Status:   3.  Pt will ambulate 1/2 mile on TM with little to no increased left knee s/s to return to walking at [facility] during normal weekly shopping                Status at last note/certification: Progressing pt amb 1/4 mile on TM 2.2-2.5 in 6'35\" with good gait pattern and tolerance              Current Status:    4.  Pt will improve FOTO score to 73 in 13 visits to show significant improvement in function for progress to performing normal walking and work activity with little difficulty              Status at last note/certification:  Regressed to 62, when asked pt reports he feels like some days are worse for the knee, but feels like it's not because of PT, he feels that PT has really helped              Current Status:       ASSESSMENT/RECOMMENDATIONS: Pt has attended 15 sessions of skilled PT to this point, reporting a 80% improvement since [facility]. Pt has met 4/6 goals however is still having consistent pain (8/10 at worst, 3/10 on average) and still feels limited with standing for long periods while working. Pt states that back pain contributes as well to shortened work tolerance however notes that he has had PT for it in the past and it increased pain.  Pt has made consistent progressions in overall strength and tolerance for activity as well at consistently attended appointments, being motivated to continue improving to achieve PLOF.      [x]Continue therapy per initial plan/protocol at a frequency of  2 x per week for 8 visits  []Continue therapy with the following recommended changes:_____________________      _____________________________________________________________________  []Discontinue therapy progressing towards or have reached established goals  []Discontinue therapy due to lack of appreciable progress towards goals  []Discontinue therapy due to lack of attendance or compliance  []Await Physician's recommendations/decisions regarding therapy  []Other:________________________________________________________________    Thank you for this referral.   Cricket Sanchez, JYOTI 4/8/2021 1:13 PM  NOTE TO PHYSICIAN:  [address] TO [facility] Physical Therapy: [phone]  If you are unable to process this request in 24 hours please contact our office:   [phone]  []  I have read the above report and request that my patient continue as recommended. []  I have read the above report and request that my patient continue therapy with the following changes/special instructions:________________________________________  []I have read the above report and request that my patient be discharged from therapy.     [de-identified] Signature:____________Date:_________TIME:________     LOTTIE Huynh  ** Signature, Date and Time must be completed for valid certification **

## 2021-04-13 ENCOUNTER — APPOINTMENT (OUTPATIENT)
Dept: PHYSICAL THERAPY | Age: 51
End: 2021-04-13
Payer: OTHER GOVERNMENT

## 2021-06-09 ENCOUNTER — HOSPITAL ENCOUNTER (OUTPATIENT)
Dept: PHYSICAL THERAPY | Age: 51
Discharge: HOME | End: 2021-06-09
Payer: OTHER GOVERNMENT

## 2021-06-09 PROCEDURE — 97110 THERAPEUTIC EXERCISES: CPT

## 2021-06-09 PROCEDURE — 97035 APP MDLTY 1+ULTRASOUND EA 15: CPT

## 2021-06-09 PROCEDURE — 97161 PT EVAL LOW COMPLEX 20 MIN: CPT

## 2021-06-09 NOTE — PROGRESS NOTES
PT DAILY TREATMENT NOTE/KNEE EVAL     Patient Name: Tere Samaniego  Date:2021  : 1970  [x]  Patient  Verified  Payor: [facility] CCN / Plan: [facility] CCN / Product Type: Federal Funded Programs /    In time:5:07  Out time:5:55  Total Treatment Time (min): 48  Visit #: 1 of 10    Medicare/BCBS Only   Total Timed Codes (min):    1:1 Treatment Time:          Treatment Area: Left knee pain [M25.562]      SUBJECTIVE  Pain Level (0-10 scale): 6  [x]constant []intermittent []improving []worsening []no change since onset     Any medication changes, allergies to medications, adverse drug reactions, diagnosis change, or new procedure performed?: [x] No    [] Yes (see summary sheet for update)  Subjective functional status/changes:       Subjective: Patient is a 45 y.o. male referred to PT with the above Dx. Patient seen today for c/o left knee pain since having a MM repair on 21 with reports constant pain over the last two weeks. Patient was in Physical Therapy after surgery on but had a delay in treatment due to insurance authorization delays. Patient was last seen 21 and was showing good results with treatment program.  Onset of additional pain on or about 21. Patient reports that while he was at work today he felt a pop in the left knee resulting in increased pain. Pop noted at the front of the knee inferior to the patella. Patient presents to PT with an impaired gait, decreased balance, decreased strength, decreased flexibility, and decreased mobility of the left knee. Patient s/s appear to be consistent w/ diagnosis. Patient demonstrates the potential to make functional gains within a reasonable time frame. Patient will benefit from skilled PT to address impairments and improve functional mobility, strength, gait and balance for an improved quality of life.   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 Yes with pain    What type of daily activities/hobbies? Work, Walking dog,    Limitations to Activity/Recreation/PLOF: Walking, Walking down stairs     Barriers: [x]pain []financial []time []transportation []other    Motivation: High    FABQ Score: []low []elevate    Cognition: A & O x 3    Other:    Risk For Falls:   [x]No  []low []elevate           OBJECTIVE/EXAMINATION    Demonstrated Mobility: (I)  Gait: Right antalgic gait  - TTP Pes anserine and patella tendon areas  - Decreased (B) VMO initiation         AROM PROM Strength Pain? ?    Right Left Right Left Right Left    Hip Flx     5 5    Knee Flx  119   5 4+    Knee Ext  -2   5 5                                      Modality rationale: decrease inflammation, decrease pain and increase tissue extensibility to improve the patients ability to tolerate usual activity   Min Type Additional Details      [] Estim:  []Unatt       []IFC  []Premod                        []Other:  []w/ice   []w/heat  Position:  Location:      [] Estim: []Att    []TENS instruct  []NMES                    []Other:  []w/US   []w/ice   []w/heat  Position:  Location:      []  Traction: [] Cervical       []Lumbar                       [] Prone          []Supine                       []Intermittent   []Continuous Lbs:  [] before manual  [] after manual    8 [x]  Ultrasound: [x]Continuous   [] Pulsed                           []1MHz   [x]3MHz W/cm2: 1.3  Location: Pes Anserine, patella tendon and surrounding patella      []  Iontophoresis with dexamethasone         Location: [] Take home patch   [] In clinic []  Ice     []  heat  []  Ice massage  []  Laser   []  Anodyne Position:  Location:      []  Laser with stim  []  Other:  Position:  Location:      []  Vasopneumatic Device Pressure:       [] lo [] med [] hi   Temperature: [] lo [] med [] hi    [x] Skin assessment post-treatment:  [x]intact []redness- no adverse reaction    []redness - adverse reaction:      24 min [x]Eval                  []Re-Eval         16 min Therapeutic Exercise:  [] See flow sheet : Develop and instruct HEP   Rationale: increase ROM, increase strength, and improve coordination to improve the patients ability to Initiate HEP and improve daily function                              With   [x] TE   [] TA   [] neuro   [] other: Patient Education: [x] Review HEP    [] Progressed/Changed HEP based on:   [] positioning   [] body mechanics   [] transfers   [] heat/ice application    [] other:       Other Objective/Functional Measures:      Access Code: [email or web address]  URL: [email or web address]  Date: 06/09/2021  Prepared by: Boyd Mejia    Exercises  Long Sitting Quad Set - 1 x daily - 7 x weekly - 10 reps - 3 sets  Supine Knee Extension Strengthening - 1 x daily - 7 x weekly - 10 reps - 3 sets  Supine Straight Leg Raises - 1 x daily - 7 x weekly - 10 reps - 3 sets  Sit to Stand - 1 x daily - 7 x weekly - 6 sets - 10 reps  Standing Knee Flexion - 1 x daily - 7 x weekly - 10 reps - 3 sets    Pain Level (0-10 scale) post treatment: 6     ASSESSMENT/Changes in Function:  No change in knee Flx or pain after treatment        [x]  See Plan of Care  []  See progress note/recertification  []  See Discharge Summary         Progress towards goals / Updated goals:  Short Term Goals: To be accomplished in 5 treatments:  1. Pt will be compliant and independent with HEP in order to facilitate PT sessions and aid with self management   Eval Status:  Initiated   Current Status:  2.   Pt to tolerate 30 min or more of TE and/or Interventions w/o increased s/s   Eval Status:  Initiated   Current Status:    Long Term Goals: To be accomplished in 10 treatments:  1. Pt will report 50% improvement or better with left knee dysfunction to show a significant increase in ability to tolerate ADL   Eval Status:  Initiated   Current Status:  2. Pt will have decreased pain at 3/10 or better to allow an improved gait pattern and return to walking his dog with little difficulty    Eval Status:  Pain 6/10   Current Status:  3. Pt will demonstrate MMT left knee Flx at 5-/5 with little to no added pain for good left knee stability with usual daily activity     Eval Status:  MMT left knee Flx 4+/5 with pain   Current Status:  4. Pt will ambulate on TM for 1/2 mile with a good pace and no added pain to return to walking with usual work with little to no added discomfort    Eval Status:  Initiated   Current Status:  5.   Pt will improve FOTO score to 69 in 13 visits to show significant improvement in function for progress to performing usual work and daily household chores with little difficulty   Eval Status: FOTO = 63   Current Status:      PLAN  [x]  Upgrade activities as tolerated     [x]  Continue plan of care  []  Update interventions per flow sheet       []  Discharge due to:_  []  Other:_         Olegario Martínez, PT 6/9/2021  5:10 PM

## 2021-06-09 NOTE — PROGRESS NOTES
[facility] at [facility]  [address] [facility]  Phone: [phone]      Fax:  [phone]    Plan of Care/ Statement of Necessity for Physical Therapy Services  Patient name: Glendy Hudson Start of Care: 2021   Referral source: Mario Romero : 1970    Medical Diagnosis: Left knee pain [M25.562]  Payor: [facility] CCN / Plan: [facility] CCN / Product Type: [employer] Programs /  Onset Date: On or about 21    Treatment Diagnosis: Left Knee pain   Prior Hospitalization: see medical history Provider#: [id number]   Medications: Verified on Patient summary List   Comorbidities: DM, OA, Visual Impaired  Prior Level of Function: Able to perform usual work with progressive worsening     The Plan of Care and following information is based on the information from the initial evaluation. Assessment/ key information: Patient is a 45 y.o. male referred to PT with the above Dx. Patient seen today for c/o left knee pain since having a MM repair on 21 with reports constant pain over the last two weeks. Patient was in Physical Therapy after surgery on but had a delay in treatment due to insurance authorization delays. Patient was last seen 21 and was showing good results with treatment program.  Onset of additional pain on or about 21. Patient reports that while he was at work today he felt a pop in the left knee resulting in increased pain. Pop noted at the front of the knee inferior to the patella.       Patient presents to PT with an impaired gait, decreased balance, decreased strength, decreased flexibility, and decreased mobility of the left knee. Patient s/s appear to be consistent w/ diagnosis. Patient demonstrates the potential to make functional gains within a reasonable time frame.   Patient will benefit from skilled PT to address impairments and improve functional mobility, strength, gait and balance for an improved quality of life. Fall Risk Assessment: Patient demonstrates no Fall Risk       Evaluation Complexity History HIGH Complexity :3+ comorbidities / personal factors will impact the outcome/ POC ; Examination LOW Complexity : 1-2 Standardized tests and measures addressing body structure, function, activity limitation and / or participation in recreation  ;Presentation LOW Complexity : Stable, uncomplicated  ;Clinical Decision Making MEDIUM Complexity : FOTO score of 26-74  Overall Complexity Rating: LOW   Problem List: pain affecting function, decrease ROM, decrease strength, edema affecting function, impaired gait/ balance, decrease ADL/ functional abilitiies, decrease activity tolerance, decrease flexibility/ joint mobility, decrease transfer abilities and other FOTO = 63   Treatment Plan may include any combination of the following: Therapeutic exercise, Therapeutic activities, Neuromuscular re-education, Physical agent/modality, Gait/balance training, Manual therapy, Patient education, Self Care training, Functional mobility training, Home safety training and Stair training  Patient / Family readiness to learn indicated by: asking questions, trying to perform skills and interest  Persons(s) to be included in education: patient (P)  Barriers to Learning/Limitations: None  Patient Goal (s): Less pain is the biggest thing  Patient Self Reported Health Status: fair  Rehabilitation Potential: good    Short Term Goals: To be accomplished in 5 treatments:  1. Pt will be compliant and independent with HEP in order to facilitate PT sessions and aid with self management             Eval Status:  Initiated             Current Status:  2. Pt to tolerate 30 min or more of TE and/or Interventions w/o increased s/s             Eval Status:  Initiated             Current Status:     Long Term Goals: To be accomplished in 10 treatments:  1.   Pt will report 50% improvement or better with left knee dysfunction to show a significant increase in ability to tolerate ADL             Eval Status:  Initiated             Current Status:  2. Pt will have decreased pain at 3/10 or better to allow an improved gait pattern and return to walking his dog with little difficulty              Eval Status:  Pain 6/10             Current Status:  3. Pt will demonstrate MMT left knee Flx at 5-/5 with little to no added pain for good left knee stability with usual daily activity               Eval Status:  MMT left knee Flx 4+/5 with pain             Current Status:  4. Pt will ambulate on TM for 1/2 mile with a good pace and no added pain to return to walking with usual work with little to no added discomfort              Eval Status:  Initiated             Current Status:  5. Pt will improve FOTO score to 69 in 13 visits to show significant improvement in function for progress to performing usual work and daily household chores with little difficulty             Eval Status: FOTO = 63             Current Status:     Frequency / Duration: Patient to be seen 2-3 times per week for 10 treatments. Patient/ Caregiver education and instruction: Diagnosis, prognosis, self care, activity modification and exercises   [x]  Plan of care has been reviewed with PTA  Comments:  Patient provided w/CATHY Jamison, PT 6/9/2021 5:09 PM  _____________________________________________________________________  I certify that the above Therapy Services are being furnished while the patient is under my care. I agree with the treatment plan and certify that this therapy is necessary.     [de-identified] Signature:____________Date:_________TIME:________     Danette Dee*  ** Signature, Date and Time must be completed for valid certification **    Please sign and return to [facility] at [address] [place] [address]  [address]  Phone: [phone]      Fax:  [phone]

## 2021-06-14 ENCOUNTER — HOSPITAL ENCOUNTER (OUTPATIENT)
Dept: PHYSICAL THERAPY | Age: 51
Discharge: HOME | End: 2021-06-14
Payer: OTHER GOVERNMENT

## 2021-06-14 PROCEDURE — 97014 ELECTRIC STIMULATION THERAPY: CPT

## 2021-06-14 PROCEDURE — 97110 THERAPEUTIC EXERCISES: CPT

## 2021-06-14 NOTE — PROGRESS NOTES
PT DAILY TREATMENT NOTE     Patient Name: Melva Close  Date:2021  : 1970  [x]  Patient  Verified  Payor: [facility] CCN / Plan: Flora Clement / Product Type: Federal Funded Programs /    In time:   Out time: 162  Total Treatment Time (min): 52  Visit #: 2 of 10    Treatment Area: Left knee pain [M25.562]    SUBJECTIVE  Pain Level (0-10 scale): 10  Any medication changes, allergies to medications, adverse drug reactions, diagnosis change, or new procedure performed?: [x] No    [] Yes (see summary sheet for update)  Subjective functional status/changes:   [] No changes reported  Pt reports he hurt his knee at work, was walking normal and felt like he tore something. Has not followed up with MD but has had increased pain since. Pt's next MD appt is in July.      OBJECTIVE    Modality rationale: decrease inflammation, decrease pain and increase tissue extensibility to improve the patients ability to    Min Type Additional Details   10 [x] Estim:  [x]Unatt       [x]IFC  []Premod                        []Other:  []w/ice   [x]w/heat  Position: long sitting  Location: left knee    [] Estim: []Att    []TENS instruct  []NMES                    []Other:  []w/US   []w/ice   []w/heat  Position:  Location:    []  Traction: [] Cervical       []Lumbar                       [] Prone          []Supine                       []Intermittent   []Continuous Lbs:  [] before manual  [] after manual    []  Ultrasound: []Continuous   [] Pulsed                           []1MHz   []3MHz W/cm2:  Location:    []  Iontophoresis with dexamethasone         Location: [] Take home patch   [] In clinic    []  Ice     []  heat  []  Ice massage  []  Laser   []  Anodyne Position:  Location:    []  Laser with stim  []  Other:  Position:  Location:    []  Vasopneumatic Device  Pre-treatment girth:  Post-treatment girth:  Measured at (location):  Pressure:       [] lo [] med [] hi   Temperature: [] lo [] med [] hi   [] Skin assessment post-treatment:  []intact []redness- no adverse reaction    []redness - adverse reaction:     39 min Therapeutic Exercise:  [x] See flow sheet : mobility and strengthening for LE in available range   Rationale: increase ROM and increase strength to improve the patients ability to improve activity tolerance          With   [x] TE   [] TA   [] neuro   [] other: Patient Education: [x] Review HEP    [] Progressed/Changed HEP based on:   [x] positioning   [] body mechanics   [] transfers   [] heat/ice application    [] other:      Other Objective/Functional Measures:   --initiated therapeutic intervention via flowsheet     Pain Level (0-10 scale) post treatment: 4/10 \"it's gone down a little\"    ASSESSMENT/Changes in Function: Pt presents with high pain levels and overall decreased mobility after break from therapy due to authorization time lapse. Pt reports increased pain with most active movement of the knee however demos good form. Concluded session with MH/IFC for passive recovery and pain relief. Patient will continue to benefit from skilled PT services to modify and progress therapeutic interventions, address functional mobility deficits, address ROM deficits, address strength deficits, analyze and address soft tissue restrictions, analyze and cue movement patterns and analyze and modify body mechanics/ergonomics to attain remaining goals. []  See Plan of Care  []  See progress note/recertification  []  See Discharge Summary         Progress towards goals / Updated goals:  Short Term Goals: To be accomplished in 5 treatments:  1. Pt will be compliant and independent with HEP in order to facilitate PT sessions and aid with self management             Eval Status:  Initiated             Current Status: Progressing, pt reports he does some of the exercises, mostly the stretches 6/14/21  2.   Pt to tolerate 30 min or more of TE and/or Interventions w/o increased s/s Eval Status:  Initiated             Current Status:      Long Term Goals: To be accomplished in 10 treatments:  1. Pt will report 50% improvement or better with left knee dysfunction to show a significant increase in ability to tolerate ADL             Eval Status:  Initiated             Current Status:  2. Pt will have decreased pain at 3/10 or better to allow an improved gait pattern and return to walking his dog with little difficulty              Eval Status:  Pain 6/10             Current Status:  3. Pt will demonstrate MMT left knee Flx at 5-/5 with little to no added pain for good left knee stability with usual daily activity               Eval Status:  MMT left knee Flx 4+/5 with pain             Current Status:  4. Pt will ambulate on TM for 1/2 mile with a good pace and no added pain to return to walking with usual work with little to no added discomfort              Eval Status:  Initiated             Current Status:  5.   Pt will improve FOTO score to 69 in 13 visits to show significant improvement in function for progress to performing usual work and daily household chores with little difficulty             Eval Status: FOTO = 63             Current Status:     PLAN  [x]  Upgrade activities as tolerated     [x]  Continue plan of care  []  Update interventions per flow sheet       []  Discharge due to:_  []  Other:_      Adelfo Merritt, PTA 6/14/2021  3:46 PM    Future Appointments   Date Time Provider Rajiv Wallis   6/16/2021  3:30 PM Stacy Salgado [facility]   6/18/2021  2:45 PM Alisa Erickson PT [facility]

## 2021-06-16 ENCOUNTER — HOSPITAL ENCOUNTER (OUTPATIENT)
Dept: PHYSICAL THERAPY | Age: 51
Discharge: HOME | End: 2021-06-16
Payer: OTHER GOVERNMENT

## 2021-06-16 PROCEDURE — 97110 THERAPEUTIC EXERCISES: CPT

## 2021-06-16 PROCEDURE — 97014 ELECTRIC STIMULATION THERAPY: CPT

## 2021-06-16 NOTE — PROGRESS NOTES
PT DAILY TREATMENT NOTE     Patient Name: Sobeida Scott  Date:2021  : 1970  [x]  Patient  Verified  Payor: [facility] / Plan: [facility] CCN / Product Type: [employer] /    In time: 667  Out time: 64  Total Treatment Time (min): 50  Visit #: 3 of 10    Treatment Area: Left knee pain [M25.562]    SUBJECTIVE  Pain Level (0-10 scale): 6/10  Any medication changes, allergies to medications, adverse drug reactions, diagnosis change, or new procedure performed?: [x] No    [] Yes (see summary sheet for update)  Subjective functional status/changes:   [] No changes reported  Pt reports pain from work today    OBJECTIVE    Modality rationale: decrease inflammation, decrease pain and increase tissue extensibility to improve the patients ability to    Min Type Additional Details   10 [x] Estim:  [x]Unatt       [x]IFC  []Premod                        []Other:  []w/ice   [x]w/heat  Position: long sitting  Location: left knee    [] Estim: []Att    []TENS instruct  []NMES                    []Other:  []w/US   []w/ice   []w/heat  Position:  Location:    []  Traction: [] Cervical       []Lumbar                       [] Prone          []Supine                       []Intermittent   []Continuous Lbs:  [] before manual  [] after manual    []  Ultrasound: []Continuous   [] Pulsed                           []1MHz   []3MHz W/cm2:  Location:    []  Iontophoresis with dexamethasone         Location: [] Take home patch   [] In clinic    []  Ice     []  heat  []  Ice massage  []  Laser   []  Anodyne Position:  Location:    []  Laser with stim  []  Other:  Position:  Location:    []  Vasopneumatic Device  Pre-treatment girth:  Post-treatment girth:  Measured at (location):  Pressure:       [] lo [] med [] hi   Temperature: [] lo [] med [] hi   [x] Skin assessment post-treatment:  [x]intact []redness- no adverse reaction    []redness - adverse reaction:     40 min Therapeutic Exercise:  [x] See flow sheet : mobility and strengthening for LE in available range   Rationale: increase ROM and increase strength to improve the patients ability to improve activity tolerance          With   [x] TE   [] TA   [] neuro   [] other: Patient Education: [x] Review HEP    [] Progressed/Changed HEP based on:   [x] positioning   [] body mechanics   [] transfers   [] heat/ice application    [] other:      Other Objective/Functional Measures:   --continued elevated pain  --fair tolerance for therex     Pain Level (0-10 scale) post treatment: 6/10    ASSESSMENT/Changes in Function: Pt reports he had no improvement after last session, limited tolerance for activity due to pain with most active motions of knee flexion and extension. 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 Plan to continue progressing as tolerated. Patient will continue to benefit from skilled PT services to modify and progress therapeutic interventions, address functional mobility deficits, address ROM deficits, address strength deficits, analyze and address soft tissue restrictions, analyze and cue movement patterns and analyze and modify body mechanics/ergonomics to attain remaining goals. []  See Plan of Care  []  See progress note/recertification  []  See Discharge Summary         Progress towards goals / Updated goals:  Short Term Goals: To be accomplished in 5 treatments:  1. Pt will be compliant and independent with HEP in order to facilitate PT sessions and aid with self management             Eval Status:  Initiated             Current Status: Progressing, pt reports he does some of the exercises, mostly the stretches 6/14/21  2.   Pt to tolerate 30 min or more of TE and/or Interventions w/o increased s/s             Eval Status:  Initiated             Current Status: Pt tolerates full session however has pain with most activities 6/16/21     Long Term Goals: To be accomplished in 10 treatments:  1. Pt will report 50% improvement or better with left knee dysfunction to show a significant increase in ability to tolerate ADL             Eval Status:  Initiated             Current Status:  2. Pt will have decreased pain at 3/10 or better to allow an improved gait pattern and return to walking his dog with little difficulty              Eval Status:  Pain 6/10             Current Status:  3. Pt will demonstrate MMT left knee Flx at 5-/5 with little to no added pain for good left knee stability with usual daily activity               Eval Status:  MMT left knee Flx 4+/5 with pain             Current Status:  4. Pt will ambulate on TM for 1/2 mile with a good pace and no added pain to return to walking with usual work with little to no added discomfort              Eval Status:  Initiated             Current Status:  5.   Pt will improve FOTO score to 69 in 13 visits to show significant improvement in function for progress to performing usual work and daily household chores with little difficulty             Eval Status: FOTO = 63             Current Status:     PLAN  [x]  Upgrade activities as tolerated     [x]  Continue plan of care  []  Update interventions per flow sheet       []  Discharge due to:_  []  Other:_      Prakash Ramsey, JYOTI 6/16/2021  3:46 PM    Future Appointments   Date Time Provider Rajiv Wallis   6/18/2021  2:45 PM Haroon Fisher, PT [place] [facility]

## 2021-06-18 ENCOUNTER — HOSPITAL ENCOUNTER (OUTPATIENT)
Dept: PHYSICAL THERAPY | Age: 51
Discharge: HOME | End: 2021-06-18
Payer: OTHER GOVERNMENT

## 2021-06-18 PROCEDURE — 97035 APP MDLTY 1+ULTRASOUND EA 15: CPT

## 2021-06-18 PROCEDURE — 97110 THERAPEUTIC EXERCISES: CPT

## 2021-06-18 NOTE — PROGRESS NOTES
PT DAILY TREATMENT NOTE     Patient Name: Kala Noyola  Date:2021  : 1970  [x]  Patient  Verified  Payor: [facility] CCN / Plan: [facility] CCN / Product Type: [employer] /    In time:2:47  Out time:3:35  Total Treatment Time (min): 48  Visit #: 4 of 10    Medicare/BCBS Only   Total Timed Codes (min):    1:1 Treatment Time:          Treatment Area: Left knee pain [M25.562]    SUBJECTIVE  Pain Level (0-10 scale): 6  Any medication changes, allergies to medications, adverse drug reactions, diagnosis change, or new procedure performed?: [x] No    [] Yes (see summary sheet for update)  Subjective functional status/changes:   [] No changes reported  \"I think going back to work has been hard on me\".    Pt reports constant aching surrounding the left knee    OBJECTIVE    Modality rationale: decrease inflammation, decrease pain and increase tissue extensibility to improve the patients ability to decrease discomfort with activity   Min Type Additional Details    [] Estim:  []Unatt       []IFC  []Premod                        []Other:  []w/ice   []w/heat  Position:  Location:    [] Estim: []Att    []TENS instruct  []NMES                    []Other:  []w/US   []w/ice   []w/heat  Position:  Location:    []  Traction: [] Cervical       []Lumbar                       [] Prone          []Supine                       []Intermittent   []Continuous Lbs:  [] before manual  [] after manual   8 [x]  Ultrasound: [x]Continuous   [] Pulsed                           [x]1MHz   []3MHz W/cm2: 1.7  Location:     []  Iontophoresis with dexamethasone         Location: [] Take home patch   [] In clinic    []  Ice     []  heat  []  Ice massage  []  Laser   []  Anodyne Position:  Location:    []  Laser with stim  []  Other:  Position:  Location:    []  Vasopneumatic Device    []  Right     []  Left  Pre-treatment girth:  Post-treatment girth:  Measured at (location):  Pressure:       [] lo [] med [] hi   Temperature: [] lo [] med [] hi   [x] Skin assessment post-treatment:  [x]intact []redness- no adverse reaction    []redness - adverse reaction:     40 min Therapeutic Exercise:  [] See flow sheet : KT I-Strip for spacing at the Left MCL full strength    Rationale: increase ROM, increase strength and improve coordination to improve the patients ability to tolerate increased activity          With   [x] TE   [] TA   [] neuro   [] other: Patient Education: [x] Review HEP    [] Progressed/Changed HEP based on:   [] positioning   [] body mechanics   [] transfers   [] heat/ice application    [] other:      Other Objective/Functional Measures:   - Laxity and TTP at the MCL      Pain Level (0-10 scale) post treatment: 6    ASSESSMENT/Changes in Function:   - Pt reports no change in symptoms after use of US     Patient will continue to benefit from skilled PT services to modify and progress therapeutic interventions, address functional mobility deficits, address ROM deficits, address strength deficits, analyze and address soft tissue restrictions and analyze and cue movement patterns to attain remaining goals.      []  See Plan of Care  []  See progress note/recertification  []  See Discharge Summary         Progress towards goals / Updated goals:  Short Term Goals: To be accomplished in 5 treatments:  1.  Pt will be compliant and independent with HEP in order to facilitate PT sessions and aid with self management             Eval Status:  Initiated             Current Status: Progressing, pt reports he does some of the exercises, mostly the stretches 6/14/21  2.  Pt to tolerate 30 min or more of TE and/or Interventions w/o increased s/s             Eval Status:  Initiated             Current Status: Pt tolerates full session however has pain with most activities 6/16/21     Long Term Goals: To be accomplished in 10 treatments:  1.  Pt will report 50% improvement or better with left knee dysfunction to show a significant increase in ability to tolerate ADL             Eval Status:  Initiated             Current Status:  2.  Pt will have decreased pain at 3/10 or better to allow an improved gait pattern and return to walking his dog with little difficulty              Eval Status:  Pain 6/10             Current Status: No change at 6/10 6/18/21  3.  Pt will demonstrate MMT left knee Flx at 5-/5 with little to no added pain for good left knee stability with usual daily activity               Eval Status:  MMT left knee Flx 4+/5 with pain             Current Status:  4.  Pt will ambulate on TM for 1/2 mile with a good pace and no added pain to return to walking with usual work with little to no added discomfort              Eval Status:  Initiated             Current Status:  5.  Pt will improve FOTO score to 69 in 13 visits to show significant improvement in function for progress to performing usual work and daily household chores with little difficulty             Eval Status: FOTO = 63             Current Status:     PLAN  [x]  Upgrade activities as tolerated     [x]  Continue plan of care  []  Update interventions per flow sheet       []  Discharge due to:_  []  Other:_      Abbi Lowe PT 6/18/2021  1:28 PM    Future Appointments   Date Time Provider Rajiv Wallis   6/18/2021  2:45 PM Kaylie Hobson, PT [place] [facility]

## 2021-06-21 ENCOUNTER — HOSPITAL ENCOUNTER (OUTPATIENT)
Dept: PHYSICAL THERAPY | Age: 51
Discharge: HOME | End: 2021-06-21
Payer: OTHER GOVERNMENT

## 2021-06-21 PROCEDURE — 97035 APP MDLTY 1+ULTRASOUND EA 15: CPT

## 2021-06-21 PROCEDURE — 97110 THERAPEUTIC EXERCISES: CPT

## 2021-06-21 NOTE — PROGRESS NOTES
PT DAILY TREATMENT NOTE     Patient Name: Adrián Scott  Date:2021  : 1970  [x]  Patient  Verified  Payor: [facility] CCN / Plan: [facility] CCN / Product Type: [employer] /    In CQUL:2423  Out time:1015  Total Treatment Time (min): 41  Visit #: 5 of 10    Medicare/BCBS Only   Total Timed Codes (min):    1:1 Treatment Time:          Treatment Area: Left knee pain [M25.562]    SUBJECTIVE  Pain Level (0-10 scale): 6  Any medication changes, allergies to medications, adverse drug reactions, diagnosis change, or new procedure performed?: [x] No    [] Yes (see summary sheet for update)  Subjective functional status/changes:   [] No changes reported  Can feel the tape stretching but no change in pain    OBJECTIVE    Modality rationale: decrease inflammation, decrease pain and increase tissue extensibility to improve the patients ability to decrease discomfort with activity   Min Type Additional Details    [] Estim:  []Unatt       []IFC  []Premod                        []Other:  []w/ice   []w/heat  Position:  Location:    [] Estim: []Att    []TENS instruct  []NMES                    []Other:  []w/US   []w/ice   []w/heat  Position:  Location:    []  Traction: [] Cervical       []Lumbar                       [] Prone          []Supine                       []Intermittent   []Continuous Lbs:  [] before manual  [] after manual   8 [x]  Ultrasound: [x]Continuous   [] Pulsed                           [x]1MHz   []3MHz W/cm2: 1.7  Location: MCL/pes Anserine    []  Iontophoresis with dexamethasone         Location: [] Take home patch   [] In clinic    []  Ice     []  heat  []  Ice massage  []  Laser   []  Anodyne Position:  Location:    []  Laser with stim  []  Other:  Position:  Location:    []  Vasopneumatic Device    []  Right     []  Left  Pre-treatment girth:  Post-treatment girth:  Measured at (location):  Pressure:       [] lo [] med [] hi   Temperature: [] lo [] med [] hi   [x] Skin assessment post-treatment:  [x]intact []redness- no adverse reaction    []redness  adverse reaction:     36 min Therapeutic Exercise:  [] See flow sheet : KT I-Strip for spacing at the Left MCL full strength    Rationale: increase ROM, increase strength and improve coordination to improve the patients ability to tolerate increased activity          With   [x] TE   [] TA   [] neuro   [] other: Patient Education: [x] Review HEP    [] Progressed/Changed HEP based on:   [] positioning   [] body mechanics   [] transfers   [] heat/ice application    [] other:      Other Objective/Functional Measures:   - Laxity and TTP at the [facility]      Pain Level (0-10 scale) post treatment: 6    ASSESSMENT/Changes in Function:   - Pt continues to reports no change in symptoms after use of US  - Pt expects to see MD next month in July 2021     Patient will continue to benefit from skilled PT services to modify and progress therapeutic interventions, address functional mobility deficits, address ROM deficits, address strength deficits, analyze and address soft tissue restrictions and analyze and cue movement patterns to attain remaining goals.      []  See Plan of Care  []  See progress note/recertification  []  See Discharge Summary         Progress towards goals / Updated goals:  Short Term Goals: To be accomplished in 5 treatments:  1.  Pt will be compliant and independent with HEP in order to facilitate PT sessions and aid with self management             Eval Status:  Initiated             Current Status: Progressing, pt reports he does some of the exercises, mostly the stretches 6/14/21  2.  Pt to tolerate 30 min or more of TE and/or Interventions w/o increased s/s             Eval Status:  Initiated             Current Status: Pt tolerates full session however has pain with most activities 6/16/21     Long Term Goals: To be accomplished in 10 treatments:  1.  Pt will report 50% improvement or better with left knee dysfunction to show a significant increase in ability to tolerate ADL             Eval Status:  Initiated             Current Status: No change 6/21/21  2.  Pt will have decreased pain at 3/10 or better to allow an improved gait pattern and return to walking his dog with little difficulty              Eval Status:  Pain 6/10             Current Status: No change at 6/10 6/21/21  3.  Pt will demonstrate MMT left knee Flx at 5-/5 with little to no added pain for good left knee stability with usual daily activity               Eval Status:  MMT left knee Flx 4+/5 with pain             Current Status:  4.  Pt will ambulate on TM for 1/2 mile with a good pace and no added pain to return to walking with usual work with little to no added discomfort              Eval Status:  Initiated             Current Status:  5.  Pt will improve FOTO score to 69 in 13 visits to show significant improvement in function for progress to performing usual work and daily household chores with little difficulty             Eval Status: FOTO = 63             Current Status:     PLAN  [x]  Upgrade activities as tolerated     [x]  Continue plan of care  []  Update interventions per flow sheet       []  Discharge due to:_  []  Other:_      Hernan Ortiz, PT 6/21/2021  1:28 PM    Future Appointments   Date Time Provider Rajiv Wallis   6/25/2021 12:30 PM Trish Ojeda [place] [facility]   7/1/2021 12:30 PM Evy Alexander [facility] [facility]   7/6/2021 11:45 AM Med Felix PTA [place] [facility]   7/8/2021 11:45 AM Med Felix PTA [facility] [facility]

## 2021-06-25 ENCOUNTER — HOSPITAL ENCOUNTER (OUTPATIENT)
Dept: PHYSICAL THERAPY | Age: 51
Discharge: HOME | End: 2021-06-25
Payer: OTHER GOVERNMENT

## 2021-06-25 PROCEDURE — 97110 THERAPEUTIC EXERCISES: CPT

## 2021-06-25 PROCEDURE — 97035 APP MDLTY 1+ULTRASOUND EA 15: CPT

## 2021-06-25 NOTE — PROGRESS NOTES
PT DAILY TREATMENT NOTE     Patient Name: Allegra Scott  Date:2021  : 1970  [x]  Patient  Verified  Payor: [facility] CCN / Plan: [facility] CCN / Product Type: [employer] /    In [employer] time:110  Total Treatment Time (min): 40  Visit #: 6 of 10    Medicare/BCBS Only   Total Timed Codes (min):   1:1 Treatment Time:         Treatment Area: Left knee pain [M25.562]    SUBJECTIVE  Pain Level (0-10 scale): 5  Any medication changes, allergies to medications, adverse drug reactions, diagnosis change, or new procedure performed?: [x] No    [] Yes (see summary sheet for update)  Subjective functional status/changes:   [] No changes reported  Patient reported decreased pain upon arrival but overall increased difficulty standing and walking since \"popping\" feeling in knee while at work a week or two ago    OBJECTIVE            Modality rationale: decrease inflammation, decrease pain and increase tissue extensibility to improve the patients ability to decrease discomfort with activity   Min Type Additional Details      []? Estim:  []? Unatt       []? IFC  []? Premod                        []?Other:  []?w/ice   []?w/heat  Position:  Location:      []? Estim: []? Att    []? TENS instruct  []? NMES                    []?Other:  []?w/US   []?w/ice   []?w/heat  Position:  Location:      []? Traction: []? Cervical       []? Lumbar                       []? Prone          []? Supine                       []?Intermittent   []? Continuous Lbs:  []? before manual  []? after manual    8 [x]? Ultrasound: [x]? Continuous   []? Pulsed                           [x]? 1MHz   []? 3MHz W/cm2: 1.7  Location: MCL/pes Anserine      []? Iontophoresis with dexamethasone         Location: []? Take home patch   []? In clinic      []? Ice     []?  heat  []? Ice massage  []? Laser   []? Anodyne Position:  Location:      []? Laser with stim  []? Other:  Position:  Location:      []? Vasopneumatic Device    []? Right     []? Left  Pre-treatment girth:  Post-treatment girth:  Measured at (location):  Pressure:       []? lo []? med []? hi   Temperature: []? lo []? med []? hi    [x]? Skin assessment post-treatment:  [x]? intact []? redness- no adverse reaction    []? redness - adverse reaction:      32 min Therapeutic Exercise:  []? See flow sheet : KT I-Strip for spacing at the Left MCL full strength    Rationale: increase ROM, increase strength and improve coordination to improve the           With   [] TE   [] TA   [] neuro   [] other: Patient Education: [x] Review HEP    [] Progressed/Changed HEP based on:   [] positioning   [] body mechanics   [] transfers   [] heat/ice application    [] other:      Other Objective/Functional Measures:   FOTO 54  Progressed therex per flow sheet     Pain Level (0-10 scale) post treatment: 5.5    ASSESSMENT/Changes in Function: patient tolerated progressed therex well with minimal c/o increased pain but demonstrated decreased function based on FOTO score regression. Patient noted his knee has bothered him more for the last 2 weeks when he re-injured himself walking around at work. Patient will continue to benefit from skilled PT services to modify and progress therapeutic interventions, address functional mobility deficits, address ROM deficits, address strength deficits, analyze and address soft tissue restrictions and analyze and cue movement patterns to attain remaining goals.      [x]  See Plan of Care  []  See progress note/recertification  []  See Discharge Summary         Progress towards goals / Updated goals:  Short Term Goals: To be accomplished in 5 treatments:  1.  Pt will be compliant and independent with HEP in order to facilitate PT sessions and aid with self management             Eval Status:  Initiated             Current Status: Progressing, pt reports he does some of the exercises, mostly the stretches 6/14/21  2.  Pt to tolerate 30 min or more of TE and/or Interventions w/o increased s/s             Eval Status:  Initiated             Current Status: Pt tolerates full session however has pain with most activities 6/16/21     Long Term Goals: To be accomplished in 10 treatments:  1.  Pt will report 50% improvement or better with left knee dysfunction to show a significant increase in ability to tolerate ADL             Eval Status:  Initiated             Current Status: No change 6/21/21  2.  Pt will have decreased pain at 3/10 or better to allow an improved gait pattern and return to walking his dog with little difficulty              Eval Status:  Pain 6/10             Current Status: No change at 6/10 6/21/21  3.  Pt will demonstrate MMT left knee Flx at 5-/5 with little to no added pain for good left knee stability with usual daily activity               Eval Status:  MMT left knee Flx 4+/5 with pain             Current Status:  4.  Pt will ambulate on TM for 1/2 mile with a good pace and no added pain to return to walking with usual work with little to no added discomfort              Eval Status:  Initiated             Current Status:  5.  Pt will improve FOTO score to 69 in 13 visits to show significant improvement in function for progress to performing usual work and daily household chores with little difficulty             Eval Status: FOTO = 63             Current Status:  Regression FOTO 54 (6/25/21)     PLAN  []  Upgrade activities as tolerated     [x]  Continue plan of care  []  Update interventions per flow sheet       []  Discharge due to:_  []  Other:_      Paula Maradiaga PTA 6/25/2021  12:35 PM    Future Appointments   Date Time Provider Rajiv Wallis   7/1/2021 12:30 PM Donita Agosto [facility]   7/6/2021 11:45 AM Yosvany Larios PTA [facility]   7/8/2021 11:45 AM Yosvany Larios PTA [facility]

## 2021-07-01 ENCOUNTER — HOSPITAL ENCOUNTER (OUTPATIENT)
Dept: PHYSICAL THERAPY | Age: 51
Discharge: HOME | End: 2021-07-01
Payer: OTHER GOVERNMENT

## 2021-07-01 PROCEDURE — 97035 APP MDLTY 1+ULTRASOUND EA 15: CPT

## 2021-07-01 PROCEDURE — 97140 MANUAL THERAPY 1/> REGIONS: CPT

## 2021-07-01 PROCEDURE — 97110 THERAPEUTIC EXERCISES: CPT

## 2021-07-01 NOTE — PROGRESS NOTES
PT DAILY TREATMENT NOTE     Patient Name: Anastasia Salazar  Date:2021  : 1970  [x]  Patient  Verified  Payor: [facility] CCN / Plan: Blanca Colby / Product Type: Federal Funded Programs /    In time: 9975  Out time: 6068  Total Treatment Time (min): 52  Visit #: 7 of 10    Treatment Area: Left knee pain [M25.562]    SUBJECTIVE  Pain Level (0-10 scale): 10  Any medication changes, allergies to medications, adverse drug reactions, diagnosis change, or new procedure performed?: [x] No    [] Yes (see summary sheet for update)  Subjective functional status/changes:   [x] No changes reported  Work is strenuous    OBJECTIVE    Modality rationale: decrease inflammation, decrease pain and increase tissue extensibility to improve the patients ability to    Min Type Additional Details    [] Estim:  []Unatt       []IFC  []Premod                        []Other:  []w/ice   []w/heat  Position: long sitting  Location: left knee    [] Estim: []Att    []TENS instruct  []NMES                    []Other:  []w/US   []w/ice   []w/heat  Position:  Location:    []  Traction: [] Cervical       []Lumbar                       [] Prone          []Supine                       []Intermittent   []Continuous Lbs:  [] before manual  [] after manual   8 [x]  Ultrasound: []Continuous   [] Pulsed                           [x]1MHz   []3MHz W/cm2: 1.8  Location: left MCL    []  Iontophoresis with dexamethasone         Location: [] Take home patch   [] In clinic    []  Ice     []  heat  []  Ice massage  []  Laser   []  Anodyne Position:  Location:    []  Laser with stim  []  Other:  Position:  Location:    []  Vasopneumatic Device  Pre-treatment girth:  Post-treatment girth:  Measured at (location):  Pressure:       [] lo [] med [] hi   Temperature: [] lo [] med [] hi   [x] Skin assessment post-treatment:  [x]intact []redness- no adverse reaction    []redness - adverse reaction:     33 min Therapeutic Exercise: [x] See flow sheet : mobility and strengthening for LE in available range, MCL I-strip tape   Rationale: increase ROM and increase strength to improve the patients ability to improve activity tolerance    8 min Manual Therapy:  STM, cross friction massage to left MCL   The manual therapy interventions were performed at a separate and distinct time from the therapeutic activities interventions. Rationale: decrease pain and increase tissue extensibility to improve mobility          With   [x] TE   [] TA   [] neuro   [] other: Patient Education: [x] Review HEP    [] Progressed/Changed HEP based on:   [x] positioning   [] body mechanics   [] transfers   [] heat/ice application    [] other:      Other Objective/Functional Measures:   --good response to KT tape    Pain Level (0-10 scale) post treatment: 5.5/10 \"still doing alright\"    ASSESSMENT/Changes in Function: Pt has continued difficulty with pain typically related to vocational tasks, pain with most active movements but is able to tolerate most therex. TTP at [facility]/pes anserine. Patient will continue to benefit from skilled PT services to modify and progress therapeutic interventions, address functional mobility deficits, address ROM deficits, address strength deficits, analyze and address soft tissue restrictions, analyze and cue movement patterns and analyze and modify body mechanics/ergonomics to attain remaining goals. []  See Plan of Care  []  See progress note/recertification  []  See Discharge Summary         Progress towards goals / Updated goals:  Short Term Goals: To be accomplished in 5 treatments:  1. Pt will be compliant and independent with HEP in order to facilitate PT sessions and aid with self management             Eval Status:  Initiated             Current Status: Progressing, pt reports he does some of the exercises, mostly the stretches 6/14/21  2.   Pt to tolerate 30 min or more of TE and/or Interventions w/o increased s/s Eval Status:  Initiated             Current Status: Pt tolerates full session however has pain with most activities 6/16/21     Long Term Goals: To be accomplished in 10 treatments:  1. Pt will report 50% improvement or better with left knee dysfunction to show a significant increase in ability to tolerate ADL             Eval Status:  Initiated             Current Status:  2. Pt will have decreased pain at 3/10 or better to allow an improved gait pattern and return to walking his dog with little difficulty              Eval Status:  Pain 6/10             Current Status: Continued pain at 5.5-6/10 7/1/21  3. Pt will demonstrate MMT left knee Flx at 5-/5 with little to no added pain for good left knee stability with usual daily activity               Eval Status:  MMT left knee Flx 4+/5 with pain             Current Status:  4. Pt will ambulate on TM for 1/2 mile with a good pace and no added pain to return to walking with usual work with little to no added discomfort              Eval Status:  Initiated             Current Status:   5.   Pt will improve FOTO score to 69 in 13 visits to show significant improvement in function for progress to performing usual work and daily household chores with little difficulty             Eval Status: FOTO = 63             Current Status:     PLAN  [x]  Upgrade activities as tolerated     [x]  Continue plan of care  []  Update interventions per flow sheet       []  Discharge due to:_  []  Other:_      Margo Choe PTA 7/1/2021  3:46 PM    Future Appointments   Date Time Provider Rajiv Wallis   7/6/2021 11:45 AM Jb Guajardo PTA [facility]   7/8/2021 11:45 AM Jb Guajardo PTA [facility]

## 2021-07-06 ENCOUNTER — HOSPITAL ENCOUNTER (OUTPATIENT)
Dept: PHYSICAL THERAPY | Age: 51
Discharge: HOME | End: 2021-07-06
Payer: OTHER GOVERNMENT

## 2021-07-06 PROCEDURE — 97140 MANUAL THERAPY 1/> REGIONS: CPT

## 2021-07-06 PROCEDURE — 97035 APP MDLTY 1+ULTRASOUND EA 15: CPT

## 2021-07-06 PROCEDURE — 97110 THERAPEUTIC EXERCISES: CPT

## 2021-07-06 NOTE — PROGRESS NOTES
PT DAILY TREATMENT NOTE     Patient Name: Anastasia Salazar  Date:2021  : 1970  [x]  Patient  Verified  Payor: [facility] / Plan: [facility] CCN / Product Type: [address] Programs /    In time: 9857  Out time: 8657  Total Treatment Time (min): 48  Visit #: 8 of 10    Treatment Area: Left knee pain [M25.562]    SUBJECTIVE  Pain Level (0-10 scale): 5.5-6/10  Any medication changes, allergies to medications, adverse drug reactions, diagnosis change, or new procedure performed?: [x] No    [] Yes (see summary sheet for update)  Subjective functional status/changes:   [x] No changes reported    OBJECTIVE    Modality rationale: decrease inflammation, decrease pain and increase tissue extensibility to improve the patients ability to    Min Type Additional Details    [] Estim:  []Unatt       []IFC  []Premod                        []Other:  []w/ice   []w/heat  Position: long sitting  Location: left knee    [] Estim: []Att    []TENS instruct  []NMES                    []Other:  []w/US   []w/ice   []w/heat  Position:  Location:    []  Traction: [] Cervical       []Lumbar                       [] Prone          []Supine                       []Intermittent   []Continuous Lbs:  [] before manual  [] after manual   8 [x]  Ultrasound: [x]Continuous   [] Pulsed                           [x]1MHz   []3MHz W/cm2: 1.8  Location: left MCL    []  Iontophoresis with dexamethasone         Location: [] Take home patch   [] In clinic    []  Ice     []  heat  []  Ice massage  []  Laser   []  Anodyne Position:  Location:    []  Laser with stim  []  Other:  Position:  Location:    []  Vasopneumatic Device  Pre-treatment girth:  Post-treatment girth:  Measured at (location):  Pressure:       [] lo [] med [] hi   Temperature: [] lo [] med [] hi   [x] Skin assessment post-treatment:  [x]intact []redness- no adverse reaction    []redness - adverse reaction:     32 min Therapeutic Exercise:  [x] See flow sheet : mobility and strengthening for LE in available range, MCL I-strip tape   Rationale: increase ROM and increase strength to improve the patients ability to improve activity tolerance    8 min Manual Therapy:  STM, cross friction massage to left MCL   The manual therapy interventions were performed at a separate and distinct time from the therapeutic activities interventions. Rationale: decrease pain and increase tissue extensibility to improve mobility          With   [x] TE   [] TA   [] neuro   [] other: Patient Education: [x] Review HEP    [] Progressed/Changed HEP based on:   [x] positioning   [] body mechanics   [] transfers   [] heat/ice application    [] other:      Other Objective/Functional Measures:   --KT tape applied  --initiate functional motions (squat/step up) next visit if tolerated    Pain Level (0-10 scale) post treatment: 5.5/10    ASSESSMENT/Changes in Function: Pt responds decently to session, pain with most activities but is able to tolerate all. No noted change in pain after session, limited change since [facility] with pain and overall functionality. Patient will continue to benefit from skilled PT services to modify and progress therapeutic interventions, address functional mobility deficits, address ROM deficits, address strength deficits, analyze and address soft tissue restrictions, analyze and cue movement patterns and analyze and modify body mechanics/ergonomics to attain remaining goals. []  See Plan of Care  []  See progress note/recertification  []  See Discharge Summary         Progress towards goals / Updated goals:  Short Term Goals: To be accomplished in 5 treatments:  1. Pt will be compliant and independent with HEP in order to facilitate PT sessions and aid with self management             Eval Status:  Initiated             Current Status: Progressing, pt reports he does some of the exercises, mostly the stretches 6/14/21  2.   Pt to tolerate 30 min or more of TE and/or Interventions w/o increased s/s             Eval Status:  Initiated             Current Status: No change pt tolerates full session however has pain with most activities 7/6/21     Long Term Goals: To be accomplished in 10 treatments:  1. Pt will report 50% improvement or better with left knee dysfunction to show a significant increase in ability to tolerate ADL             Eval Status:  Initiated             Current Status:  2. Pt will have decreased pain at 3/10 or better to allow an improved gait pattern and return to walking his dog with little difficulty              Eval Status:  Pain 6/10             Current Status: Continued pain at 5.5-6/10 7/1/21  3. Pt will demonstrate MMT left knee Flx at 5-/5 with little to no added pain for good left knee stability with usual daily activity               Eval Status:  MMT left knee Flx 4+/5 with pain             Current Status:  4. Pt will ambulate on TM for 1/2 mile with a good pace and no added pain to return to walking with usual work with little to no added discomfort              Eval Status:  Initiated             Current Status:   5.   Pt will improve FOTO score to 69 in 13 visits to show significant improvement in function for progress to performing usual work and daily household chores with little difficulty             Eval Status: FOTO = 63             Current Status: FOTO regressed to 54 6/25/21    PLAN  [x]  Upgrade activities as tolerated     [x]  Continue plan of care  []  Update interventions per flow sheet       []  Discharge due to:_  []  Other:_      Amy Ervin PTA 7/6/2021  3:46 PM    Future Appointments   Date Time Provider Rajiv Wallis   7/8/2021 11:45 AM Tammy Kaplan PTA [facility]

## 2021-07-07 NOTE — PROGRESS NOTES
[facility] at [address] [facility] [address]  [address]  Phone: [phone]      Fax:  [phone]    Discharge Summary    Patient name: Shyam Colon Start of Care: 2021   Referral LOTTIE Garcia : 1970               Medical Diagnosis: Pain in left knee [M25.562]  Payor: [facility] CCN / Plan: [id number] / Product Type: [employer] /  Onset Date: Sx 21               Treatment Diagnosis: Left knee pain   Prior Hospitalization: see medical history Provider#: [id number]   Medications: Verified on Patient summary List   Comorbidities: DM, OA, Cornia transplant (B) eye,  Prior Level of Function: Long time (B) Knee pain.  Able to stand for work 35 - 40 hrs without difficulty     Visits from Start of Care: 15                                                Missed Visits: 1     Reporting Period : 3/4/21 to 21      Updated Goals: to be achieved in 8 visits:  1. Pt will be able to stand for 65 min without pain or discomfort to improve tolerance for work activities               Status at last note/certification: previously reported no trouble with 60 mins, however has had some \"bad days\" impacting ability to stand for long periods              Current Status: Not Met, Not addressed    2.  Pt will demonstrate knee flex/ext MMT 4+/5 without pain or discomfort to achieve safe ability to perform functional movements such as squatting and stairs              Status at last note/certification: left knee flex 5-/5, ext 4-/5 *painful with ext*               Current Status: Not Met, Not addressed   3.  Pt will ambulate 1/2 mile on TM with little to no increased left knee s/s to return to walking at [employer] during normal weekly shopping                Status at last note/certification: Progressing pt amb 1/4 mile on TM 2.2-2.5 in 6'35\" with good gait pattern and tolerance              Current Status: Not Met, Not addressed 4.  Pt will improve FOTO score to 73 in 13 visits to show significant improvement in function for progress to performing normal walking and work activity with little difficulty              Status at last note/certification:  Regressed to 62, when asked pt reports he feels like some days are worse for the knee, but feels like it's not because of PT, he feels that PT has really helped              Current Status: Not Met, Not addressed     Assessment/ Summary of Care: Pt was on hold due to insurance issues which resulted in this episode being [address]. Pt has since been re-established in PT on a new episode since 6/9/21 and is a current patient. The following is his assessment from the last progress note for this episode. Pt has attended 15 sessions of skilled PT to this point, reporting a 80% improvement since [facility]. Pt has met 4/6 goals however is still having consistent pain (8/10 at worst, 3/10 on average) and still feels limited with standing for long periods while working. Pt states that back pain contributes as well to shortened work tolerance however notes that he has had PT for it in the past and it increased pain. Pt has made consistent progressions in overall strength and tolerance for activity as well at consistently attended appointments, being motivated to continue improving to achieve PLOF.     RECOMMENDATIONS:  [x]Discontinue therapy: []Patient has reached or is progressing toward set goals      [x] Insurance authorization      []Due to lack of appreciable progress towards set goals    Chavez Lozada, PT 7/7/2021 8:40 AM

## 2021-07-09 ENCOUNTER — HOSPITAL ENCOUNTER (OUTPATIENT)
Dept: PHYSICAL THERAPY | Age: 51
Discharge: HOME | End: 2021-07-09
Payer: OTHER GOVERNMENT

## 2021-07-09 PROCEDURE — 97530 THERAPEUTIC ACTIVITIES: CPT

## 2021-07-09 PROCEDURE — 97110 THERAPEUTIC EXERCISES: CPT

## 2021-07-09 PROCEDURE — 97035 APP MDLTY 1+ULTRASOUND EA 15: CPT

## 2021-07-09 NOTE — PROGRESS NOTES
PT DAILY TREATMENT NOTE     Patient Name: Yuan Saldana  Date:2021  : 1970  [x]  Patient  Verified  Payor: [facility] CCN / Plan: [facility] CCN / Product Type: Federal Funded Programs /    In [employer] time:1230  Total Treatment Time (min): 39  Visit #: 9 of 10    Medicare/BCBS Only   Total Timed Codes (min):    1:1 Treatment Time:          Treatment Area: Left knee pain [M25.562]    SUBJECTIVE  Pain Level (0-10 scale): 6  Any medication changes, allergies to medications, adverse drug reactions, diagnosis change, or new procedure performed?: [x] No    [] Yes (see summary sheet for update)  Subjective functional status/changes:   [] No changes reported  Had a lot of pain last night but down to 6/10 today.   Pain at the medial knee    OBJECTIVE    Modality rationale: decrease inflammation, decrease pain and increase tissue extensibility to improve the patients ability to limit discomfort at the left MCL   Min Type Additional Details    [] Estim:  []Unatt       []IFC  []Premod                        []Other:  []w/ice   []w/heat  Position:  Location:    [] Estim: []Att    []TENS instruct  []NMES                    []Other:  []w/US   []w/ice   []w/heat  Position:  Location:    []  Traction: [] Cervical       []Lumbar                       [] Prone          []Supine                       []Intermittent   []Continuous Lbs:  [] before manual  [] after manual   8 [x]  Ultrasound: []Continuous   [] Pulsed                           []1MHz   []3MHz W/cm2:  Location:    []  Iontophoresis with dexamethasone         Location: [] Take home patch   [] In clinic    []  Ice     []  heat  []  Ice massage  []  Laser   []  Anodyne Position:  Location:    []  Laser with stim  []  Other:  Position:  Location:    []  Vasopneumatic Device    []  Right     []  Left  Pre-treatment girth:  Post-treatment girth:  Measured at (location):  Pressure:       [] lo [] med [] hi   Temperature: [] lo [] med [] hi   [x] Skin assessment post-treatment:  [x]intact []redness- no adverse reaction    []redness - adverse reaction:     23 min Therapeutic Exercise:  [] See flow sheet :  KT I-Strip for spacing at the left MCL full stretch    Rationale: increase ROM, increase strength and improve coordination to improve the patients ability to improve activity tolerance    8 min Therapeutic Activity:  [x]  See flow sheet :   Rationale: increase strength and improve coordination  to improve the patients ability to improve daily activity           With   [x] TE   [] TA   [] neuro   [] other: Patient Education: [x] Review HEP    [] Progressed/Changed HEP based on:   [] positioning   [] body mechanics   [] transfers   [] heat/ice application    [] other:      Other Objective/Functional Measures:   - Pt arr with a new knee brace today stating that it helps a little  - TTP Superior and inferior attachment for MCL  and at MCL  - (+) left knee Valgus Stress Test  - Decreased pain at 5.5 after US  - Add HS Curl, Sit - Stand, HR/TR      Pain Level (0-10 scale) post treatment: 5.5    ASSESSMENT/Changes in Function:   - MCL Strain left knee  - Good tolerance with added weight and exercises per flow sheet    Patient will continue to benefit from skilled PT services to modify and progress therapeutic interventions, address functional mobility deficits, address ROM deficits, address strength deficits, analyze and address soft tissue restrictions and analyze and cue movement patterns to attain remaining goals.      []  See Plan of Care  []  See progress note/recertification  []  See Discharge Summary         Progress towards goals / Updated goals:  Short Term Goals: To be accomplished in 5 treatments:  1.  Pt will be compliant and independent with HEP in order to facilitate PT sessions and aid with self management             Eval Status:  Initiated             Current Status: Progressing, pt reports he does some of the exercises, mostly the stretches 6/14/21  2.  Pt to tolerate 30 min or more of TE and/or Interventions w/o increased s/s             Eval Status:  Initiated             Current Status: No change pt tolerates full session however has pain with most activities 7/6/21     Long Term Goals: To be accomplished in 10 treatments:  1.  Pt will report 50% improvement or better with left knee dysfunction to show a significant increase in ability to tolerate ADL             Eval Status:  Initiated             Current Status:  Not much of a change reported 7/9/21  2.  Pt will have decreased pain at 3/10 or better to allow an improved gait pattern and return to walking his dog with little difficulty              Eval Status:  Pain 6/10             Current Status: Continued pain at 6/10 7/9/21  3.  Pt will demonstrate MMT left knee Flx at 5-/5 with little to no added pain for good left knee stability with usual daily activity               Eval Status:  MMT left knee Flx 4+/5 with pain             Current Status:  4.  Pt will ambulate on TM for 1/2 mile with a good pace and no added pain to return to walking with usual work with little to no added discomfort              Eval Status:  Initiated             Current Status:   5.  Pt will improve FOTO score to 69 in 13 visits to show significant improvement in function for progress to performing usual work and daily household chores with little difficulty             Eval Status: FOTO = 63             Current Status: Heather Polo regressed to 54 6/25/21    PLAN  [x]  Upgrade activities as tolerated     [x]  Continue plan of care  []  Update interventions per flow sheet       []  Discharge due to:_  []  Other:_      Alissa Moyer, PT 7/9/2021  11:56 AM    No future appointments.

## 2021-07-11 NOTE — PROGRESS NOTES
[facility]  [address] [facility], [address] [place] [address]  Phone: [phone]      Fax:  [phone]    Progress Note  Patient name: Anastasai Salazar Start of Care: 2021   Referral source: Yuly Lyons : 1970               Medical Diagnosis: Left knee pain [M25.562]  Payor: [facility] CCN / Plan: [address] / Product Type: [employer] Programs /  Onset Date: On or about 21               Treatment Diagnosis: Left Knee pain   Prior Hospitalization: see medical history Provider#: [id number]   Medications: Verified on Patient summary List   Comorbidities: DM, OA, Visual Impaired  Prior Level of Function: Able to perform usual work with progressive worsening                  Visits from Start of Care: 9    Missed Visits: 0    Established Goals:          Excellent Good         Limited           None  [] Increased ROM   []  [x]  []  []  [] Increased Strength  []  []  [x]  []  [] Increased Mobility  []  []  [x]  []   [] Decreased Pain   []  []  [x]  []  [] Decreased Swelling  []  []  []  []    Key Functional Changes: Patient has shown slow progress with this treatment program. Pain as of last visit was 6/10 with primary complaint at the left MCL region. Patient has a (+) left valgus stress test with TTP at the [facility] and femoral/tibial condyle attachments . Patient has shown elevated pain at the [facility] region since reporting a pop in that area when walking at work. Patient reports essentially no ramsey since start of treatment however reports some decreased discomfort with the use of Ultra Sound and KT-Tape. All STG/LTGs achieved as identified below.       Progress towards goals / Updated goals:  Short Term Goals: To be accomplished in 5 treatments:  1.  Pt will be compliant and independent with HEP in order to facilitate PT sessions and aid with self management             Eval Status:  Initiated             Current Status: Progressing, pt reports he does some of the exercises, mostly the stretches 6/14/21  2.  Pt to tolerate 30 min or more of TE and/or Interventions w/o increased s/s             Eval Status:  Initiated             Current Status: No change pt tolerates full session however has pain with most activities 7/6/21     Long Term Goals: To be accomplished in 10 treatments:  1.  Pt will report 50% improvement or better with left knee dysfunction to show a significant increase in ability to tolerate ADL             Eval Status:  Initiated             Current Status:  Not much of a change reported 7/9/21  2.  Pt will have decreased pain at 3/10 or better to allow an improved gait pattern and return to walking his dog with little difficulty              Eval Status:  Pain 6/10             Current Status: Continued pain at 6/10 7/9/21  3.  Pt will demonstrate MMT left knee Flx at 5-/5 with little to no added pain for good left knee stability with usual daily activity               Eval Status:  MMT left knee Flx 4+/5 with pain             Current Status:  4.  Pt will ambulate on TM for 1/2 mile with a good pace and no added pain to return to walking with usual work with little to no added discomfort              Eval Status:  Initiated             Current Status:   5.  Pt will improve FOTO score to 69 in 13 visits to show significant improvement in function for progress to performing usual work and daily household chores with little difficulty             Eval Status: FOTO = 63             Current Status: FOTO regressed to 54 6/25/21     Updated Goals: to be achieved in 6 treatments:  Short Term Goals: To be accomplished in 5 treatments:  1.  Pt will be compliant and independent with [facility] in order to facilitate PT sessions and aid with self management              Status at last note/certification: Progressing, pt reports he does some of the exercises, mostly the stretches    Current Status:     2.  Pt to tolerate 30 min or more of TE and/or Interventions w/o increased s/s              Status at last note/certification: No change pt tolerates full session however has pain with most activities   Current Status:     3.  Pt will report 50% improvement or better with left knee dysfunction to show a significant increase in ability to tolerate ADL              Status at last note/certification: Not much of a change reported   Current Status:     4.  Pt will have decreased pain at 3/10 or better to allow an improved gait pattern and return to walking his dog with little difficulty               Status at last note/certification: Continued pain at 6/10   Current Status:     5.  Pt will demonstrate MMT left knee Flx at 5-/5 with little to no added pain for good left knee stability with usual daily activity                Status at last note/certification: MMT left knee Flx 4+/5 with pain   Current Status:     6.  Pt will ambulate on TM for 1/2 mile with a good pace and no added pain to return to walking with usual work with little to no added discomfort               Status at last note/certification: Initiated   Current Status:     7.  Pt will improve FOTO score to 69 in 13 visits to show significant improvement in function for progress to performing usual work and daily household chores with little difficulty              Status at last note/certification: FOTO regressed to 47   Current Status:          ASSESSMENT/RECOMMENDATIONS:  [x]Continue therapy per initial plan/protocol at a frequency of  2-3 x per week for 6 treatments  []Continue therapy with the following recommended changes:_____________________      _____________________________________________________________________  []Discontinue therapy progressing towards or have reached established goals  []Discontinue therapy due to lack of appreciable progress towards goals  []Discontinue therapy due to lack of attendance or compliance  []Await Physician's recommendations/decisions regarding therapy  []Other:________________________________________________________________    Thank you for this referral.   Agnieszka Haley, PT 7/11/2021 2:23 PM  NOTE TO PHYSICIAN:  PLEASE COMPLETE THE ORDERS BELOW AND   FAX TO [facility]: [phone]  If you are unable to process this request in 24 hours please contact our office:   [phone]  []  I have read the above report and request that my patient continue as recommended. []  I have read the above report and request that my patient continue therapy with the following changes/special instructions:________________________________________  []I have read the above report and request that my patient be discharged from therapy.     [de-identified] Signature:____________Date:_________TIME:________     Pan Howard Dee*  ** Signature, Date and Time must be completed for valid certification **

## 2021-07-19 ENCOUNTER — HOSPITAL ENCOUNTER (OUTPATIENT)
Dept: PHYSICAL THERAPY | Age: 51
Discharge: HOME | End: 2021-07-19
Payer: OTHER GOVERNMENT

## 2021-07-19 PROCEDURE — 97110 THERAPEUTIC EXERCISES: CPT

## 2021-07-19 NOTE — PROGRESS NOTES
PT DAILY TREATMENT NOTE     Patient Name: Mario Scott  Date:2021  : 1970  [x]  Patient  Verified  Payor: [facility] CCN / Plan: [address] / Product Type: [address] Programs /    In time: 8265  Out time: 8607  Total Treatment Time (min): 39  Visit #: 1 of 6    Treatment Area: Left knee pain [M25.562]    SUBJECTIVE  Pain Level (0-10 scale): 5/10  Any medication changes, allergies to medications, adverse drug reactions, diagnosis change, or new procedure performed?: [x] No    [] Yes (see summary sheet for update)  Subjective functional status/changes:   [x] No changes reported  Pt saw MD virtually, is waiting to schedule follow up with ortho, completing an updated xray for pt's left knee.      OBJECTIVE    Modality rationale: decrease inflammation, decrease pain and increase tissue extensibility to improve the patients ability to    Min Type Additional Details    [] Estim:  []Unatt       []IFC  []Premod                        []Other:  []w/ice   []w/heat  Position: long sitting  Location: left knee    [] Estim: []Att    []TENS instruct  []NMES                    []Other:  []w/US   []w/ice   []w/heat  Position:  Location:    []  Traction: [] Cervical       []Lumbar                       [] Prone          []Supine                       []Intermittent   []Continuous Lbs:  [] before manual  [] after manual   5 [x]  Ultrasound: [x]Continuous   [] Pulsed                           [x]1MHz   [x]3MHz W/cm2: 1.8  Location: left MCL    []  Iontophoresis with dexamethasone         Location: [] Take home patch   [] In clinic    []  Ice     []  heat  []  Ice massage  []  Laser   []  Anodyne Position:  Location:    []  Laser with stim  []  Other:  Position:  Location:    []  Vasopneumatic Device  Pre-treatment girth:  Post-treatment girth:  Measured at (location):  Pressure:       [] lo [] med [] hi   Temperature: [] lo [] med [] hi   [x] Skin assessment post-treatment: [x]intact []redness- no adverse reaction    []redness - adverse reaction:     40 min Therapeutic Exercise:  [x] See flow sheet : mobility and strengthening for LE in available range, MCL I-strip tape   Rationale: increase ROM and increase strength to improve the patients ability to improve activity tolerance          With   [x] TE   [] TA   [] neuro   [] other: Patient Education: [x] Review HEP    [] Progressed/Changed HEP based on:   [x] positioning   [] body mechanics   [] transfers   [] heat/ice application    [] other:      Other Objective/Functional Measures:   --tolerates progressions    Pain Level (0-10 scale) post treatment: 5/10    ASSESSMENT/Changes in Function: Pt responds relatively well to session, pain with most activities but is able to tolerate all. Slightly decreased pain today. Patient will continue to benefit from skilled PT services to modify and progress therapeutic interventions, address functional mobility deficits, address ROM deficits, address strength deficits, analyze and address soft tissue restrictions, analyze and cue movement patterns and analyze and modify body mechanics/ergonomics to attain remaining goals.      []  See Plan of Care  []  See progress note/recertification  []  See Discharge Summary         Updated Goals: to be achieved in 6 treatments:  Short Term Goals: To be accomplished in 5 treatments:  1.  Pt will be compliant and independent with HEP in order to facilitate PT sessions and aid with self management             Status at last note/certification: Progressing, pt reports he does some of the exercises, mostly the stretches              Current Status:     2.  Pt to tolerate 30 min or more of TE and/or Interventions w/o increased s/s             Status at last note/certification: No change pt tolerates full session however has pain with most activities             Current Status: Full session, pain throughout 7/19/21   3.  Pt will report 50% improvement or better with left knee dysfunction to show a significant increase in ability to tolerate ADL             Status at last note/certification: Not much of a change reported             Current Status:     4.  Pt will have decreased pain at 3/10 or better to allow an improved gait pattern and return to walking his dog with little difficulty              Status at last note/certification: Continued pain at 6/10             Current Status:     5.  Pt will demonstrate MMT left knee Flx at 5-/5 with little to no added pain for good left knee stability with usual daily activity               Status at last note/certification: MMT left knee Flx 4+/5 with pain             Current Status:     6.  Pt will ambulate on TM for 1/2 mile with a good pace and no added pain to return to walking with usual work with little to no added discomfort              Status at last note/certification: Initiated             Current Status:     7.  Pt will improve FOTO score to 69 in 13 visits to show significant improvement in function for progress to performing usual work and daily household chores with little difficulty             Status at last note/certification: FOTO regressed to 47             Current Status:     PLAN  [x]  Upgrade activities as tolerated     [x]  Continue plan of care  []  Update interventions per flow sheet       []  Discharge due to:_  []  Other:_      Prem Macedo PTA 7/19/2021  3:46 PM    Future Appointments   Date Time Provider Rajiv Wallis   7/21/2021 11:45 AM Jamaal Cade PTA [facility]   7/26/2021 11:45 AM Ole Dakin, PT [facility]   7/28/2021  2:45 PM Jamaal Cade, [place] [facility]   8/2/2021 11:45 AM Jamaal Cade PTA [facility]   8/4/2021 11:45 AM Jamaal Cade PTA [facility]

## 2021-07-21 ENCOUNTER — HOSPITAL ENCOUNTER (OUTPATIENT)
Dept: PHYSICAL THERAPY | Age: 51
Discharge: HOME | End: 2021-07-21
Payer: OTHER GOVERNMENT

## 2021-07-21 PROCEDURE — 97110 THERAPEUTIC EXERCISES: CPT

## 2021-07-21 PROCEDURE — 97530 THERAPEUTIC ACTIVITIES: CPT

## 2021-07-21 NOTE — PROGRESS NOTES
PT DAILY TREATMENT NOTE     Patient Name: Angela Scott  Date:2021  : 1970  [x]  Patient  Verified  Payor: [facility] CCN / Plan: Billy Delgado / Product Type: [employer] Programs /    In time: 9284  Out time: 6544  Total Treatment Time (min): 46  Visit #: 2 of 6    Treatment Area: Left knee pain [M25.562]    SUBJECTIVE  Pain Level (0-10 scale): 5.5/10  Any medication changes, allergies to medications, adverse drug reactions, diagnosis change, or new procedure performed?: [x] No    [] Yes (see summary sheet for update)  Subjective functional status/changes:   [x] No changes reported    OBJECTIVE    Modality rationale: decrease inflammation, decrease pain and increase tissue extensibility to improve the patients ability to    Min Type Additional Details    [] Estim:  []Unatt       []IFC  []Premod                        []Other:  []w/ice   []w/heat  Position: long sitting  Location: left knee    [] Estim: []Att    []TENS instruct  []NMES                    []Other:  []w/US   []w/ice   []w/heat  Position:  Location:    []  Traction: [] Cervical       []Lumbar                       [] Prone          []Supine                       []Intermittent   []Continuous Lbs:  [] before manual  [] after manual   5 [x]  Ultrasound: []Continuous   [x] Pulsed                           []1MHz   [x]3MHz W/cm2: 1.3  Location: left MCL    []  Iontophoresis with dexamethasone         Location: [] Take home patch   [] In clinic    []  Ice     []  heat  []  Ice massage  []  Laser   []  Anodyne Position:  Location:    []  Laser with stim  []  Other:  Position:  Location:    []  Vasopneumatic Device  Pre-treatment girth:  Post-treatment girth:  Measured at (location):  Pressure:       [] lo [] med [] hi   Temperature: [] lo [] med [] hi   [x] Skin assessment post-treatment:  [x]intact []redness- no adverse reaction    []redness - adverse reaction:     38 min Therapeutic Exercise:  [x] See flow sheet : mobility and strengthening for LE in available range, MCL I-strip tape   Rationale: increase ROM and increase strength to improve the patients ability to improve activity tolerance    8 min Therapeutic Activity:  [x]  See flow sheet : squatting, sit<>stand    Rationale: increase strength and improve coordination  to improve the patients ability to ease tolerance for ADLs          With   [x] TE   [] TA   [] neuro   [] other: Patient Education: [x] Review HEP    [] Progressed/Changed HEP based on:   [x] positioning   [] body mechanics   [] transfers   [] heat/ice application    [] other:      Other Objective/Functional Measures:   --initiated 2\" hold on LAQ     Pain Level (0-10 scale) post treatment: 5.5/10    ASSESSMENT/Changes in Function: Increased hold on stretches with good tolerance, pt has not responded with increased or decreased pain with current program, limited progress to this date. Pt reports increased pain with most activities however is able to complete them. Likely MCL injury present, pt is waiting to hear from MD to schedule xray to re-assess knee. Pt feels like KT taping really helps support knee. Patient will continue to benefit from skilled PT services to modify and progress therapeutic interventions, address functional mobility deficits, address ROM deficits, address strength deficits, analyze and address soft tissue restrictions, analyze and cue movement patterns and analyze and modify body mechanics/ergonomics to attain remaining goals.      []  See Plan of Care  []  See progress note/recertification  []  See Discharge Summary         Updated Goals: to be achieved in 6 treatments:  Short Term Goals: To be accomplished in 5 treatments:  1.  Pt will be compliant and independent with HEP in order to facilitate PT sessions and aid with self management             Status at last note/certification: Progressing, pt reports he does some of the exercises, mostly the stretches Current Status:     2.  Pt to tolerate 30 min or more of TE and/or Interventions w/o increased s/s             Status at last note/certification: No change pt tolerates full session however has pain with most activities             Current Status: Full session, pain throughout 7/19/21   3.  Pt will report 50% improvement or better with left knee dysfunction to show a significant increase in ability to tolerate ADL             Status at last note/certification: Not much of a change reported             Current Status:     4.  Pt will have decreased pain at 3/10 or better to allow an improved gait pattern and return to walking his dog with little difficulty              Status at last note/certification: Continued pain at 6/10             Current Status:   Limited progress, pain still ranging from 5-6/10 7/21/21  5.  Pt will demonstrate MMT left knee Flx at 5-/5 with little to no added pain for good left knee stability with usual daily activity               Status at last note/certification: MMT left knee Flx 4+/5 with pain             Current Status:     6.  Pt will ambulate on TM for 1/2 mile with a good pace and no added pain to return to walking with usual work with little to no added discomfort              Status at last note/certification: Initiated             Current Status:     7.  Pt will improve FOTO score to 69 in 13 visits to show significant improvement in function for progress to performing usual work and daily household chores with little difficulty             Status at last note/certification: FOTO regressed to 47             Current Status:     PLAN  [x]  Upgrade activities as tolerated     [x]  Continue plan of care  []  Update interventions per flow sheet       []  Discharge due to:_  []  Other:_      Henok Lockhart PTA 7/21/2021  3:46 PM    Future Appointments   Date Time Provider Rajiv Wallis   7/26/2021 11:45 AM Michael Mcgowan, PT [place] [facility] [place] [facility]   7/28/2021  2:45 PM Kathy Carranza PTA [facility]   8/2/2021 11:45 AM Jb Guajardo, [place] [facility]   8/4/2021 11:45 AM Jb Guajardo, [place] [facility]

## 2021-07-26 ENCOUNTER — HOSPITAL ENCOUNTER (OUTPATIENT)
Dept: PHYSICAL THERAPY | Age: 51
Discharge: HOME | End: 2021-07-26
Payer: OTHER GOVERNMENT

## 2021-07-26 PROCEDURE — 97110 THERAPEUTIC EXERCISES: CPT

## 2021-07-26 PROCEDURE — 97035 APP MDLTY 1+ULTRASOUND EA 15: CPT

## 2021-07-26 NOTE — PROGRESS NOTES
PT DAILY TREATMENT NOTE     Patient Name: Yuan Saldana  Date:2021  : 1970  [x]  Patient  Verified  Payor: [facility] CCN / Plan: [employer] [facility] CCN / Product Type: [employer] Programs /    In time: 11:53   Out time: 12:32  Total Treatment Time (min): 39  Visit #: 3 of 6    Treatment Area: Left knee pain [M25.562]    SUBJECTIVE  Pain Level (0-10 scale): 5.5  Any medication changes, allergies to medications, adverse drug reactions, diagnosis change, or new procedure performed?: [x] No    [] Yes (see summary sheet for update)  Subjective functional status/changes:   [] No changes reported  Pt states that the [place] is going to send info over to the ortho specialist to follow up regarding the increased knee pain.       OBJECTIVE    Modality rationale: decrease inflammation, decrease pain and increase tissue extensibility to improve the patients ability to    Min Type Additional Details    [] Estim:  []Unatt       []IFC  []Premod                        []Other:  []w/ice   []w/heat  Position: long sitting  Location: left knee    [] Estim: []Att    []TENS instruct  []NMES                    []Other:  []w/US   []w/ice   []w/heat  Position:  Location:    []  Traction: [] Cervical       []Lumbar                       [] Prone          []Supine                       []Intermittent   []Continuous Lbs:  [] before manual  [] after manual   8 [x]  Ultrasound: []Continuous   [x] Pulsed                           []1MHz   [x]3MHz W/cm2: 1.3  Location: left MCL    []  Iontophoresis with dexamethasone         Location: [] Take home patch   [] In clinic    []  Ice     []  heat  []  Ice massage  []  Laser   []  Anodyne Position:  Location:    []  Laser with stim  []  Other:  Position:  Location:    []  Vasopneumatic Device  Pre-treatment girth:  Post-treatment girth:  Measured at (location):  Pressure:       [] lo [] med [] hi   Temperature: [] lo [] med [] hi   [x] Skin assessment post-treatment: [x]intact []redness- no adverse reaction    []redness - adverse reaction:     31 min Therapeutic Exercise:  [x] See flow sheet : exercises, KT I strip % to left medial knee/MCL   Rationale: increase ROM and increase strength to improve the patients ability to improve activity tolerance          With   [x] TE   [] TA   [] neuro   [] other: Patient Education: [x] Review HEP    [] Progressed/Changed HEP based on:   [x] positioning   [] body mechanics   [] transfers   [] heat/ice application    [] other:      Other Objective/Functional Measures: Added hip ABD/EXT in standing to improve glute strength and LE stability. Pain Level (0-10 scale) post treatment: 6    ASSESSMENT/Changes in Function: Reported minimal increase in pain post session today. Increased US time to improve inflammation to the left MCL. He states he feels the KT tape does help improve stability. He still continues to have pain and buckling of the left knee and educated pt to call the MD if they do not call him by next week. Continue POC as tolerated to improve pain and mobility. Patient will continue to benefit from skilled PT services to modify and progress therapeutic interventions, address functional mobility deficits, address ROM deficits, address strength deficits, analyze and address soft tissue restrictions, analyze and cue movement patterns, analyze and modify body mechanics/ergonomics, address imbalance/dizziness and instruct in home and community integration to attain remaining goals.      []  See Plan of Care  []  See progress note/recertification  []  See Discharge Summary         Updated Goals: to be achieved in 6 treatments:  Short Term Goals: To be accomplished in 5 treatments:  1.  Pt will be compliant and independent with HEP in order to facilitate PT sessions and aid with self management             Status at last note/certification: Progressing, pt reports he does some of the exercises, mostly the stretches Current Status:     2.  Pt to tolerate 30 min or more of TE and/or Interventions w/o increased s/s             Status at last note/certification: No change pt tolerates full session however has pain with most activities             Current Status: Full session, pain throughout 7/19/21   3.  Pt will report 50% improvement or better with left knee dysfunction to show a significant increase in ability to tolerate ADL             Status at last note/certification: Not much of a change reported             Current Status:     4.  Pt will have decreased pain at 3/10 or better to allow an improved gait pattern and return to walking his dog with little difficulty              Status at last note/certification: Continued pain at 6/10             Current Status:   Limited progress, continued elevated pain levels 7/26/2021  5.  Pt will demonstrate MMT left knee Flx at 5-/5 with little to no added pain for good left knee stability with usual daily activity               Status at last note/certification: MMT left knee Flx 4+/5 with pain             Current Status:     6.  Pt will ambulate on TM for 1/2 mile with a good pace and no added pain to return to walking with usual work with little to no added discomfort              Status at last note/certification: Initiated             Current Status:     7.  Pt will improve FOTO score to 69 in 13 visits to show significant improvement in function for progress to performing usual work and daily household chores with little difficulty             Status at last note/certification: FOTO regressed to 47             Current Status:       PLAN  [x]  Upgrade activities as tolerated     [x]  Continue plan of care  [x]  Update interventions per flow sheet       []  Discharge due to:_  []  Other:_      Lisandra Sequeira, PT 7/26/2021  12:00 PM    Future Appointments   Date Time Provider Rajiv Wallis   7/28/2021  2:45 PM Mariam Cooks [facility]   8/2/2021 11:45 AM [facility]   8/4/2021 11:45 AM Marc Walton PTA [facility]

## 2021-07-28 ENCOUNTER — APPOINTMENT (OUTPATIENT)
Dept: PHYSICAL THERAPY | Age: 51
End: 2021-07-28
Payer: OTHER GOVERNMENT

## 2021-07-30 ENCOUNTER — APPOINTMENT (OUTPATIENT)
Dept: PHYSICAL THERAPY | Age: 51
End: 2021-07-30
Payer: OTHER GOVERNMENT

## 2021-08-02 ENCOUNTER — APPOINTMENT (OUTPATIENT)
Dept: PHYSICAL THERAPY | Age: 51
End: 2021-08-02

## 2021-08-04 ENCOUNTER — APPOINTMENT (OUTPATIENT)
Dept: PHYSICAL THERAPY | Age: 51
End: 2021-08-04

## 2021-08-09 ENCOUNTER — APPOINTMENT (OUTPATIENT)
Dept: PHYSICAL THERAPY | Age: 51
End: 2021-08-09

## 2021-08-09 NOTE — PROGRESS NOTES
Patient: Antoni Gallardo  YOB: 1970       HISTORY:  The patient presents for reevaluation of his left knee pain. He is status post arthroscopic partial medial menisectomy with grade IV chondromalaica on 1/14/21. States pain is a 2 out of 10.  he has gone to physical therapy. He states about 4 weeks ago he started noticing some pain on the medial aspect of his knee. This gives him problems on a day-to-day basis. Worse with walking and standing for prolonged periods of time. hsa been taking aleve and wearing a brace. He also continues with PT. Patient denies any fever, chills, chest pain, shortness of breath or calf pain. The remainder of the review of systems is negative. There are no new illness or injuries to report since last seen in the office. No changes in medications, allergies, social or family history. PHYSICAL EXAMINATION:    Visit Vitals  Pulse 77   Temp 98.7 °F (37.1 °C) (Skin)   Ht 5' 9\" (1.753 m)   Wt 317 lb (143.8 kg)   SpO2 95%   BMI 46.81 kg/m²     The patient is a well-developed, well-nourished male in no acute distress. The patient is alert and oriented times three. The patient appears to be well groomed. Mood and affect are normal.   LYMPHATIC: lymph nodes are not enlarged and are within normal limits  SKIN: normal in color and non tender to palpation. There are no bruises or abrasions noted. NEUROLOGICAL: Motor sensory exam is within normal limits. Reflexes are equal bilaterally. There is normal sensation to pinprick and light touch     ORTHOPEDIC EXAM of Left knee: Inspection: Effusion not present,  incisions well healed  TTP: medial joint line  Range of motion: 0-120 flexion  Stability: Stable  Strength: 5/5  2+ distal pulses      PROCEDURE: Left knee Injection with Ultrasound Guidance    Indication:Left Knee pain/swelling    After sterile prep, 4 cc of Xylocaine and 1 cc of Kenalog were injected into the left Knee.  Intra-articular Ultrasound images captured using [address] [place] [address] Ultrasound machine using a frequency of 10 MHz with a linear transducer and scanned into patient's chart. [facility]  OFFICE PROCEDURE PROGRESS NOTE        Chart reviewed for the following:  Erich MCKEON PA, have reviewed the History, Physical and updated the Allergic reactions for [address] performed immediately prior to start of procedure:  Erich MCKEON PA-C, have performed the following reviews on [address] prior to the start of the procedure:            * Patient was identified by name and date of birth   * Agreement on procedure being performed was verified  * Risks and Benefits explained to the patient  * Procedure site verified and marked as necessary  * Patient was positioned for comfort  * Consent was signed and verified     Time: 9:24 AM    Date of procedure: 8/10/2021    Procedure performed by:  LOTTIE Kearns    Provider assisted by: (see medication administration)    How tolerated by patient: tolerated the procedure well with no complications    Comments: none  IMPRESSION:  Status post Left knee arthroscopic partial medial menisectomy with degenerative arthritis with joint space narrowing  Encounter Diagnosis   Name Primary?  Primary osteoarthritis of left knee Yes        PLAN:  1. Patient with acute exacerbation of degenerative arthritis in his left knee. We will inject with cortisone today under ultrasound guidance. We will continue with his physical therapy. Continue with his brace as needed. Continue with NSAIDs. Risk factors include: BMI greater than 45  2. Yes cortisone injection indicated today left knee  3. Yes Physical/Occupational Therapy indicated today  4. No diagnostic test indicated today:   5. No durable medical equipment indicated today  6. No referral indicated today   7. Yes medications indicated today:   8.  No Narcotic indicated today     RTC 4 weeks ASHA Álvarez [facility]

## 2021-08-10 ENCOUNTER — OFFICE VISIT (OUTPATIENT)
Dept: ORTHOPEDIC SURGERY | Age: 51
End: 2021-08-10
Payer: OTHER GOVERNMENT

## 2021-08-10 VITALS
WEIGHT: 315 LBS | OXYGEN SATURATION: 95 % | HEIGHT: 69 IN | TEMPERATURE: 98.7 F | HEART RATE: 77 BPM | BODY MASS INDEX: 46.65 KG/M2

## 2021-08-10 DIAGNOSIS — M17.12 PRIMARY OSTEOARTHRITIS OF LEFT KNEE: Primary | ICD-10-CM

## 2021-08-10 PROCEDURE — 99214 OFFICE O/P EST MOD 30 MIN: CPT | Performed by: PHYSICIAN ASSISTANT

## 2021-08-10 PROCEDURE — 20611 DRAIN/INJ JOINT/BURSA W/US: CPT | Performed by: PHYSICIAN ASSISTANT

## 2021-08-10 RX ORDER — TRIAMCINOLONE ACETONIDE 40 MG/ML
40 INJECTION, SUSPENSION INTRA-ARTICULAR; INTRAMUSCULAR ONCE
Status: COMPLETED | OUTPATIENT
Start: 2021-08-10 | End: 2021-08-10

## 2021-08-10 RX ADMIN — TRIAMCINOLONE ACETONIDE 40 MG: 40 INJECTION, SUSPENSION INTRA-ARTICULAR; INTRAMUSCULAR at 09:25

## 2021-08-11 ENCOUNTER — APPOINTMENT (OUTPATIENT)
Dept: PHYSICAL THERAPY | Age: 51
End: 2021-08-11

## 2021-11-14 NOTE — PROGRESS NOTES
[facility]  [address] [facility], [facility] [address]  Phone: [phone]      Fax:  [phone]    Discharge Summary    Patient name: Shyam Scott Start of Care: 6/9/2021   Referral source: Josh Villatoro* UIU: 9/2/0622               Medical Diagnosis: Left knee pain [M25.562]  Payor: [facility] CCN / Plan: [employer] / Product Type: [employer] Programs /  Onset Date: On or about 5/26/21               Treatment Diagnosis: Left Knee pain   Prior Hospitalization: see medical history Provider#: [id number]   Medications: Verified on Patient summary List   Comorbidities: DM, OA, Visual Impaired  Prior Level of Function: Able to perform usual work with progressive worsening                  Visits from Start of Care: 12   Missed Visits: 2    Reporting Period : 7/9/21 to 7/26/21      Updated Goals: to be achieved in 6 treatments:  Short Term Goals: To be accomplished in 5 treatments:  1.  Pt will be compliant and independent with HEP in order to facilitate PT sessions and aid with self management             Status at last note/certification: Progressing, pt reports he does some of the exercises, mostly the stretches              Current Status:  Not Met, pt reports compliance at times but not daily   2.  Pt to tolerate 30 min or more of TE and/or Interventions w/o increased s/s             Status at last note/certification: No change pt tolerates full session however has pain with most activities             Current Status: Not Met, Full session, pain throughout 7/19/21   3.  Pt will report 50% improvement or better with left knee dysfunction to show a significant increase in ability to tolerate ADL             Status at last note/certification: Not much of a change reported             Current Status:   Not Met, no significant improvment  4.  Pt will have decreased pain at 3/10 or better to allow an improved gait pattern and return to walking his dog with little difficulty              Status at last note/certification: Continued pain at 6/10             Current Status:   Not Met, Limited progress, continued elevated pain levels 7/26/2021  5.  Pt will demonstrate MMT left knee Flx at 5-/5 with little to no added pain for good left knee stability with usual daily activity               Status at last note/certification: MMT left knee Flx 4+/5 with pain             Current Status:  Not Met, MMT left knee Flx 4+/5 with pain   6.  Pt will ambulate on TM for 1/2 mile with a good pace and no added pain to return to walking with usual work with little to no added discomfort              Status at last note/certification: Initiated             Current Status: Not Met, not assessed      7.  Pt will improve FOTO score to 69 in 13 visits to show significant improvement in function for progress to performing usual work and daily household chores with little difficulty             Status at last note/certification: FOTO regressed to 47             Current Status:  Not Met, progressing at 47 with early DCd     Assessment/ Summary of Care: Patient has shown slow progress with this treatment program. Pain as of last visit was 5.5/10 with primary complaint at the left MCL region. Patient has a (+) left valgus stress test with TTP at the [facility] and femoral/tibial condyle attachments . Patient has shown elevated pain at the [facility] region since reporting a pop in that area when walking at work. Patient reports essentially no ramsey since start of treatment however reports continued decreased discomfort with the use of Ultra Sound and KT-Tape. All STG/LTGs achieved as identified below.       RECOMMENDATIONS:  [x]Discontinue therapy: []Patient has reached or is progressing toward set goals      [x]Patient is non-compliant or has abdicated due to conflicting work schedule      []Due to lack of appreciable progress towards set goals    Starr Ibarra, PT 11/14/2021 6:39 PM

## 2022-04-06 ENCOUNTER — OFFICE VISIT (OUTPATIENT)
Dept: ORTHOPEDIC SURGERY | Age: 52
End: 2022-04-06

## 2022-04-06 VITALS — OXYGEN SATURATION: 98 % | HEART RATE: 96 BPM | HEIGHT: 68 IN | WEIGHT: 315 LBS | BODY MASS INDEX: 47.74 KG/M2

## 2022-04-06 DIAGNOSIS — M17.12 PRIMARY OSTEOARTHRITIS OF LEFT KNEE: Primary | ICD-10-CM

## 2022-04-06 DIAGNOSIS — S83.242D TEAR OF MEDIAL MENISCUS OF LEFT KNEE, CURRENT, UNSPECIFIED TEAR TYPE, SUBSEQUENT ENCOUNTER: ICD-10-CM

## 2022-04-06 NOTE — PROGRESS NOTES
Patient: Stephanie Holguin  YOB: 1970       HISTORY:  The patient presents for reevaluation of his left knee pain. He is status post arthroscopic partial medial menisectomy with grade IV chondromalaica on 1/14/21. States pain is a 2 out of 10.  he has gone to physical therapy. He states about 4 weeks ago he started noticing some pain on the medial aspect of his knee. This gives him problems on a day-to-day basis. Worse with walking and standing for prolonged periods of time. hsa been taking aleve and wearing a brace. He also continues with PT. Patient denies any fever, chills, chest pain, shortness of breath or calf pain. The remainder of the review of systems is negative. There are no new illness or injuries to report since last seen in the office. No changes in medications, allergies, social or family history. PHYSICAL EXAMINATION:    Visit Vitals  Pulse 96   Ht 5' 8\" (1.727 m)   Wt 317 lb (143.8 kg)   SpO2 98%   BMI 48.20 kg/m²     The patient is a well-developed, well-nourished male in no acute distress. The patient is alert and oriented times three. The patient appears to be well groomed. Mood and affect are normal.   LYMPHATIC: lymph nodes are not enlarged and are within normal limits  SKIN: normal in color and non tender to palpation. There are no bruises or abrasions noted. NEUROLOGICAL: Motor sensory exam is within normal limits. Reflexes are equal bilaterally. There is normal sensation to pinprick and light touch  ORTHOPEDIC EXAM of Left knee: Inspection: Effusion not present,  incisions well healed  TTP: medial joint line  Range of motion: 0-120 flexion  Stability: Stable  Strength: 5/5  2+ distal pulses        IMPRESSION:  Status post Left knee arthroscopic partial medial menisectomy with degenerative arthritis with joint space narrowing    Encounter Diagnoses   Name Primary?     Primary osteoarthritis of left knee Yes    Tear of medial meniscus of left knee, current, unspecified tear type, subsequent encounter         PLAN:  1. Pt is improving post-operatively. Discussed proceeding with Wendy Nicholas if the pain continues. He would like to hold off for now. Return as needed.       RTC prn     Scribed by Benson Mello [address]) as dictated by ASHA Rg PA-C [facility]

## 2022-06-22 ENCOUNTER — TRANSCRIBE ORDER (OUTPATIENT)
Dept: SCHEDULING | Age: 52
End: 2022-06-22

## 2022-06-22 DIAGNOSIS — H53.122 TRANSIENT VISUAL LOSS OF LEFT EYE: Primary | ICD-10-CM

## 2022-06-23 ENCOUNTER — TRANSCRIBE ORDER (OUTPATIENT)
Dept: SCHEDULING | Age: 52
End: 2022-06-23

## 2022-06-23 DIAGNOSIS — G45.3 AMAUROSIS FUGAX: Primary | ICD-10-CM

## 2022-07-28 ENCOUNTER — HOSPITAL ENCOUNTER (OUTPATIENT)
Dept: NON INVASIVE DIAGNOSTICS | Age: 52
Discharge: HOME | End: 2022-07-28
Attending: OPHTHALMOLOGY
Payer: OTHER GOVERNMENT

## 2022-07-28 ENCOUNTER — HOSPITAL ENCOUNTER (OUTPATIENT)
Dept: VASCULAR SURGERY | Age: 52
Discharge: HOME | End: 2022-07-28
Attending: OPHTHALMOLOGY
Payer: OTHER GOVERNMENT

## 2022-07-28 VITALS
DIASTOLIC BLOOD PRESSURE: 78 MMHG | SYSTOLIC BLOOD PRESSURE: 141 MMHG | WEIGHT: 315 LBS | BODY MASS INDEX: 47.74 KG/M2 | HEIGHT: 68 IN

## 2022-07-28 DIAGNOSIS — G45.3 AMAUROSIS FUGAX: ICD-10-CM

## 2022-07-28 LAB
ECHO AO ASC DIAM: 3.6 CM
ECHO AO ASCENDING AORTA INDEX: 1.45 CM/M2
ECHO AO ROOT DIAM: 3.4 CM
ECHO AO ROOT INDEX: 1.37 CM/M2
ECHO LA VOL 2C: 47 ML (ref 18–58)
ECHO LA VOL 4C: 58 ML (ref 18–58)
ECHO LA VOLUME AREA LENGTH: 59 ML
ECHO LA VOLUME INDEX A2C: 19 ML/M2 (ref 16–34)
ECHO LA VOLUME INDEX A4C: 23 ML/M2 (ref 16–34)
ECHO LA VOLUME INDEX AREA LENGTH: 24 ML/M2 (ref 16–34)
ECHO LV E' LATERAL VELOCITY: 7 CM/S
ECHO LV E' SEPTAL VELOCITY: 7 CM/S
ECHO LV FRACTIONAL SHORTENING: 31 % (ref 28–44)
ECHO LV INTERNAL DIMENSION DIASTOLE INDEX: 1.45 CM/M2
ECHO LV INTERNAL DIMENSION DIASTOLIC: 3.6 CM (ref 4.2–5.9)
ECHO LV INTERNAL DIMENSION SYSTOLIC INDEX: 1 CM/M2
ECHO LV INTERNAL DIMENSION SYSTOLIC: 2.5 CM
ECHO LV IVSD: 1.6 CM (ref 0.6–1)
ECHO LV MASS 2D: 190.3 G (ref 88–224)
ECHO LV MASS INDEX 2D: 76.4 G/M2 (ref 49–115)
ECHO LV POSTERIOR WALL DIASTOLIC: 1.3 CM (ref 0.6–1)
ECHO LV RELATIVE WALL THICKNESS RATIO: 0.72
ECHO LVOT AREA: 3.8 CM2
ECHO LVOT DIAM: 2.2 CM
ECHO LVOT MEAN GRADIENT: 2 MMHG
ECHO LVOT PEAK GRADIENT: 4 MMHG
ECHO LVOT PEAK VELOCITY: 1 M/S
ECHO LVOT STROKE VOLUME INDEX: 28.5 ML/M2
ECHO LVOT SV: 71 ML
ECHO LVOT VTI: 18.7 CM
ECHO MV A VELOCITY: 0.62 M/S
ECHO MV E DECELERATION TIME (DT): 207.4 MS
ECHO MV E VELOCITY: 0.73 M/S
ECHO MV E/A RATIO: 1.18
ECHO MV E/E' LATERAL: 10.43
ECHO MV E/E' RATIO (AVERAGED): 10.43
ECHO MV E/E' SEPTAL: 10.43
ECHO RV FREE WALL PEAK S': 8 CM/S
ECHO RV TAPSE: 2.1 CM (ref 1.7–?)
LEFT BULB EDV: 23.6 CM/S
LEFT BULB PSV: 70.4 CM/S
LEFT CCA DIST DIAS: 31.7 CM/S
LEFT CCA DIST SYS: 106 CM/S
LEFT CCA MID DIAS: 28.43 CM/S
LEFT CCA MID SYS: 97.9 CM/S
LEFT CCA PROX DIAS: 25.4 CM/S
LEFT CCA PROX SYS: 91.4 CM/S
LEFT ECA DIAS: 16.31 CM/S
LEFT ECA SYS: 78.4 CM/S
LEFT ICA DIST DIAS: 27.7 CM/S
LEFT ICA DIST SYS: 61.5 CM/S
LEFT ICA MID DIAS: 24.9 CM/S
LEFT ICA MID SYS: 54.7 CM/S
LEFT ICA PROX DIAS: 29.3 CM/S
LEFT ICA PROX SYS: 59 CM/S
LEFT ICA/CCA SYS: 0.66 NO UNITS
LEFT VERTEBRAL DIAS: 14.7 CM/S
LEFT VERTEBRAL SYS: 43.9 CM/S
RIGHT BULB EDV: 11.2 CM/S
RIGHT BULB PSV: 43.8 CM/S
RIGHT CCA DIST DIAS: 21.7 CM/S
RIGHT CCA DIST SYS: 68.6 CM/S
RIGHT CCA MID DIAS: 20.35 CM/S
RIGHT CCA MID SYS: 82.96 CM/S
RIGHT CCA PROX DIAS: 15.1 CM/S
RIGHT CCA PROX SYS: 84.3 CM/S
RIGHT ECA DIAS: 15.13 CM/S
RIGHT ECA SYS: 67.3 CM/S
RIGHT ICA DIST DIAS: 21.4 CM/S
RIGHT ICA DIST SYS: 58.1 CM/S
RIGHT ICA MID DIAS: 25.6 CM/S
RIGHT ICA MID SYS: 53 CM/S
RIGHT ICA PROX DIAS: 23 CM/S
RIGHT ICA PROX SYS: 47.7 CM/S
RIGHT ICA/CCA SYS: 0.9 NO UNITS
RIGHT VERTEBRAL DIAS: 25.57 CM/S
RIGHT VERTEBRAL SYS: 59.5 CM/S
VAS LEFT SUBCLAVIAN PROX EDV: 0 CM/S
VAS LEFT SUBCLAVIAN PROX PSV: 84.9 CM/S
VAS RIGHT SUBCLAVIAN PROX EDV: 0 CM/S
VAS RIGHT SUBCLAVIAN PROX PSV: 75 CM/S

## 2022-07-28 PROCEDURE — 93880 EXTRACRANIAL BILAT STUDY: CPT

## 2022-07-28 PROCEDURE — 93306 TTE W/DOPPLER COMPLETE: CPT

## 2022-07-28 PROCEDURE — 93226 XTRNL ECG REC<48 HR SCAN A/R: CPT

## 2022-08-04 PROCEDURE — 93227 XTRNL ECG REC<48 HR R&I: CPT | Performed by: INTERNAL MEDICINE
